# Patient Record
Sex: FEMALE | Race: WHITE | Employment: UNEMPLOYED | URBAN - METROPOLITAN AREA
[De-identification: names, ages, dates, MRNs, and addresses within clinical notes are randomized per-mention and may not be internally consistent; named-entity substitution may affect disease eponyms.]

---

## 2017-12-15 ENCOUNTER — HOSPITAL ENCOUNTER (OUTPATIENT)
Dept: CT IMAGING | Age: 42
Discharge: HOME | End: 2017-12-15
Attending: FAMILY MEDICINE
Payer: MEDICARE

## 2017-12-15 DIAGNOSIS — G44.221 CHRONIC TENSION-TYPE HEADACHE, INTRACTABLE: ICD-10-CM

## 2017-12-15 PROCEDURE — 70450 CT HEAD/BRAIN W/O DYE: CPT

## 2018-06-27 ENCOUNTER — HOSPITAL ENCOUNTER (OUTPATIENT)
Dept: ULTRASOUND IMAGING | Age: 43
Discharge: HOME | End: 2018-06-27
Attending: FAMILY MEDICINE
Payer: MEDICARE

## 2018-06-27 ENCOUNTER — HOSPITAL ENCOUNTER (OUTPATIENT)
Dept: GENERAL RADIOLOGY | Age: 43
Discharge: HOME | End: 2018-06-27
Attending: FAMILY MEDICINE
Payer: MEDICARE

## 2018-06-27 DIAGNOSIS — R10.13 ABDOMINAL PAIN, EPIGASTRIC: ICD-10-CM

## 2018-06-27 PROCEDURE — 74247 XR UPPER GI W KUB AIR CONT: CPT

## 2018-06-27 PROCEDURE — 76700 US EXAM ABDOM COMPLETE: CPT

## 2022-02-25 ENCOUNTER — TELEPHONE (OUTPATIENT)
Dept: FAMILY MEDICINE CLINIC | Age: 47
End: 2022-02-25

## 2022-02-25 ENCOUNTER — OFFICE VISIT (OUTPATIENT)
Dept: FAMILY MEDICINE CLINIC | Age: 47
End: 2022-02-25
Payer: MEDICARE

## 2022-02-25 VITALS
OXYGEN SATURATION: 98 % | BODY MASS INDEX: 28.12 KG/M2 | DIASTOLIC BLOOD PRESSURE: 77 MMHG | SYSTOLIC BLOOD PRESSURE: 114 MMHG | HEIGHT: 62 IN | WEIGHT: 152.8 LBS | HEART RATE: 100 BPM | RESPIRATION RATE: 17 BRPM

## 2022-02-25 DIAGNOSIS — M54.50 CHRONIC BILATERAL LOW BACK PAIN WITHOUT SCIATICA: ICD-10-CM

## 2022-02-25 DIAGNOSIS — G44.221 CHRONIC TENSION-TYPE HEADACHE, INTRACTABLE: ICD-10-CM

## 2022-02-25 DIAGNOSIS — Z76.89 ENCOUNTER TO ESTABLISH CARE: ICD-10-CM

## 2022-02-25 DIAGNOSIS — F32.A DEPRESSION, UNSPECIFIED DEPRESSION TYPE: ICD-10-CM

## 2022-02-25 DIAGNOSIS — F41.9 ANXIETY: Primary | ICD-10-CM

## 2022-02-25 DIAGNOSIS — F43.10 PTSD (POST-TRAUMATIC STRESS DISORDER): ICD-10-CM

## 2022-02-25 DIAGNOSIS — G89.29 CHRONIC BILATERAL LOW BACK PAIN WITHOUT SCIATICA: ICD-10-CM

## 2022-02-25 DIAGNOSIS — L85.8 CUTANEOUS HORN: ICD-10-CM

## 2022-02-25 PROCEDURE — G8427 DOCREV CUR MEDS BY ELIG CLIN: HCPCS | Performed by: STUDENT IN AN ORGANIZED HEALTH CARE EDUCATION/TRAINING PROGRAM

## 2022-02-25 PROCEDURE — G8419 CALC BMI OUT NRM PARAM NOF/U: HCPCS | Performed by: STUDENT IN AN ORGANIZED HEALTH CARE EDUCATION/TRAINING PROGRAM

## 2022-02-25 PROCEDURE — G9717 DOC PT DX DEP/BP F/U NT REQ: HCPCS | Performed by: STUDENT IN AN ORGANIZED HEALTH CARE EDUCATION/TRAINING PROGRAM

## 2022-02-25 PROCEDURE — 99214 OFFICE O/P EST MOD 30 MIN: CPT | Performed by: STUDENT IN AN ORGANIZED HEALTH CARE EDUCATION/TRAINING PROGRAM

## 2022-02-25 PROCEDURE — 99386 PREV VISIT NEW AGE 40-64: CPT | Performed by: STUDENT IN AN ORGANIZED HEALTH CARE EDUCATION/TRAINING PROGRAM

## 2022-02-25 RX ORDER — PERPHENAZINE 4 MG/1
4 TABLET, FILM COATED ORAL
COMMUNITY
Start: 2022-01-14

## 2022-02-25 RX ORDER — BUPROPION HYDROCHLORIDE 100 MG/1
TABLET, EXTENDED RELEASE ORAL
COMMUNITY

## 2022-02-25 RX ORDER — ESCITALOPRAM OXALATE 10 MG/1
10 TABLET ORAL DAILY
Qty: 30 TABLET | Refills: 1 | Status: SHIPPED | OUTPATIENT
Start: 2022-02-25 | End: 2022-04-25 | Stop reason: SDUPTHER

## 2022-02-25 RX ORDER — CYCLOBENZAPRINE HCL 5 MG
5 TABLET ORAL
Qty: 20 TABLET | Refills: 0 | Status: SHIPPED | OUTPATIENT
Start: 2022-02-25 | End: 2022-03-23

## 2022-02-25 RX ORDER — ALPRAZOLAM 0.5 MG/1
TABLET ORAL
COMMUNITY
Start: 2022-01-10

## 2022-02-25 NOTE — PATIENT INSTRUCTIONS
Back Stretches: Exercises  Introduction  Here are some examples of exercises for stretching your back. Start each exercise slowly. Ease off the exercise if you start to have pain. Your doctor or physical therapist will tell you when you can start these exercises and which ones will work best for you. How to do the exercises  Overhead stretch    1. Stand comfortably with your feet shoulder-width apart. 2. Looking straight ahead, raise both arms over your head and reach toward the ceiling. Do not allow your head to tilt back. 3. Hold for 15 to 30 seconds, then lower your arms to your sides. 4. Repeat 2 to 4 times. Side stretch    1. Stand comfortably with your feet shoulder-width apart. 2. Raise one arm over your head, and then lean to the other side. 3. Slide your hand down your leg as you let the weight of your arm gently stretch your side muscles. Hold for 15 to 30 seconds. 4. Repeat 2 to 4 times on each side. Press-up    1. Lie on your stomach, supporting your body with your forearms. 2. Press your elbows down into the floor to raise your upper back. As you do this, relax your stomach muscles and allow your back to arch without using your back muscles. As your press up, do not let your hips or pelvis come off the floor. 3. Hold for 15 to 30 seconds, then relax. 4. Repeat 2 to 4 times. Relax and rest    1. Lie on your back with a rolled towel under your neck and a pillow under your knees. Extend your arms comfortably to your sides. 2. Relax and breathe normally. 3. Remain in this position for about 10 minutes. 4. If you can, do this 2 or 3 times each day. Follow-up care is a key part of your treatment and safety. Be sure to make and go to all appointments, and call your doctor if you are having problems. It's also a good idea to know your test results and keep a list of the medicines you take. Where can you learn more?   Go to http://www.gray.com/  Enter Y090 in the search box to learn more about \"Back Stretches: Exercises. \"  Current as of: July 1, 2021               Content Version: 13.0  © 0816-0686 [employer]wise, Incorporated. Care instructions adapted under license by [employer] (which disclaims liability or warranty for this information). If you have questions about a medical condition or this instruction, always ask your healthcare professional. [place] [address] any warranty or liability for your use of this information.

## 2022-02-25 NOTE — TELEPHONE ENCOUNTER
Pt at our office for new pt appointment scheduled at 3 pm with Dr. Jack Ann and should be at [address] [address]. Pt states she was given this address and has waited 3 months for appt.  Demetra
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
## 2022-02-25 NOTE — PROGRESS NOTES
Room:     Identified pt with two pt identifiers(name and ). Reviewed record in preparation for visit and have obtained necessary documentation. All patient medications has been reviewed. Chief Complaint   Patient presents with    New Patient    Mole     Pt stated she will like to get the skin tag removed.  Tremors     Right hand.  Dizziness       Health Maintenance Due   Topic    Hepatitis C Screening     COVID-19 Vaccine (1)    Depression Monitoring     Cervical cancer screen     Lipid Screen     Colorectal Cancer Screening Combo     Flu Vaccine (1)    Medicare Yearly Exam        Vitals:    22 1536   BP: 114/77   Pulse: 100   Resp: 17   SpO2: 98%   Weight: 152 lb 12.8 oz (69.3 kg)   Height: 5' 2\" (1.575 m)   PainSc:   0 - No pain       4. Have you been to the ER, urgent care clinic since your last visit? Hospitalized since your last visit? No    5. Have you seen or consulted any other health care providers outside of the [address] [place] [address] since your last visit? Include any pap smears or colon screening. Yes, Dom 26. Patient is accompanied by self I have received verbal consent from Opal Bumpers to discuss any/all medical information while they are present in the room.

## 2022-02-28 ENCOUNTER — LAB ONLY (OUTPATIENT)
Dept: FAMILY MEDICINE CLINIC | Age: 47
End: 2022-02-28

## 2022-02-28 DIAGNOSIS — Z76.89 ENCOUNTER TO ESTABLISH CARE: ICD-10-CM

## 2022-03-01 LAB
ANION GAP SERPL CALC-SCNC: 1 MMOL/L (ref 5–15)
BUN SERPL-MCNC: 14 MG/DL (ref 6–20)
BUN/CREAT SERPL: 20 (ref 12–20)
CALCIUM SERPL-MCNC: 9.5 MG/DL (ref 8.5–10.1)
CHLORIDE SERPL-SCNC: 108 MMOL/L (ref 97–108)
CHOLEST SERPL-MCNC: 171 MG/DL
CO2 SERPL-SCNC: 28 MMOL/L (ref 21–32)
CREAT SERPL-MCNC: 0.71 MG/DL (ref 0.55–1.02)
ERYTHROCYTE [DISTWIDTH] IN BLOOD BY AUTOMATED COUNT: 11.9 % (ref 11.5–14.5)
EST. AVERAGE GLUCOSE BLD GHB EST-MCNC: 105 MG/DL
GLUCOSE SERPL-MCNC: 99 MG/DL (ref 65–100)
HBA1C MFR BLD: 5.3 % (ref 4–5.6)
HCT VFR BLD AUTO: 41.1 % (ref 35–47)
HDLC SERPL-MCNC: 48 MG/DL
HDLC SERPL: 3.6 {RATIO} (ref 0–5)
HGB BLD-MCNC: 13.6 G/DL (ref 11.5–16)
LDLC SERPL CALC-MCNC: 98.6 MG/DL (ref 0–100)
MCH RBC QN AUTO: 31.9 PG (ref 26–34)
MCHC RBC AUTO-ENTMCNC: 33.1 G/DL (ref 30–36.5)
MCV RBC AUTO: 96.3 FL (ref 80–99)
NRBC # BLD: 0 K/UL (ref 0–0.01)
NRBC BLD-RTO: 0 PER 100 WBC
PLATELET # BLD AUTO: 278 K/UL (ref 150–400)
PMV BLD AUTO: 11.5 FL (ref 8.9–12.9)
POTASSIUM SERPL-SCNC: 4.2 MMOL/L (ref 3.5–5.1)
RBC # BLD AUTO: 4.27 M/UL (ref 3.8–5.2)
SODIUM SERPL-SCNC: 137 MMOL/L (ref 136–145)
TRIGL SERPL-MCNC: 122 MG/DL (ref ?–150)
TSH SERPL DL<=0.05 MIU/L-ACNC: 2.4 UIU/ML (ref 0.36–3.74)
VLDLC SERPL CALC-MCNC: 24.4 MG/DL
WBC # BLD AUTO: 8.6 K/UL (ref 3.6–11)

## 2022-03-04 ENCOUNTER — TELEPHONE (OUTPATIENT)
Dept: FAMILY MEDICINE CLINIC | Age: 47
End: 2022-03-04

## 2022-03-04 NOTE — TELEPHONE ENCOUNTER
Pt very upset that there are no appointments available today. Pt has a clogged ear x5days. I offered pt next available appointment but unfortunately pt declined.

## 2022-03-04 NOTE — TELEPHONE ENCOUNTER
----- Message from Janora Denver sent at 3/4/2022  8:14 AM EST -----  Subject: Appointment Request    Reason for Call: Ear Problem    QUESTIONS  Type of Appointment? Established Patient  Reason for appointment request? No appointments available during search  Additional Information for Provider? Right ear is clogged and needs an   urgent appointment. Today is day 5.   ---------------------------------------------------------------------------  --------------  CALL BACK INFO  What is the best way for the office to contact you? OK to leave message on   voicemail  Preferred Call Back Phone Number? [phone]  ---------------------------------------------------------------------------  --------------  SCRIPT ANSWERS  Relationship to Patient? Self  Specialty Confirmation? Primary Care  Did you have an injury or trauma within the past three days? No  Is your pain affecting your daily activities? No  Are you having fevers (100.4), chills or sweats? No  Are you experiencing new onset hearing loss? Yes   Have you been diagnosed with, awaiting test results for, or told that you   are suspected of having COVID-19 (Coronavirus)? (If patient has tested   negative or was tested as a requirement for work, school, or travel and   not based on symptoms, answer no)? No  Within the past 10 days have you developed any of the following symptoms   (answer no if symptoms have been present longer than 10 days or began   more than 10 days ago)? Fever or Chills, Cough, Shortness of breath or   difficulty breathing, Loss of taste or smell, Sore throat, Nasal   congestion, Sneezing or runny nose, Fatigue or generalized body aches   (answer no if pain is specific to a body part e.g. back pain), Diarrhea,   Headache? No  Have you had close contact with someone with COVID-19 in the last 7 days? No  (Service Expert  click yes below to proceed with [employer] As Usual   Scheduling)?  Yes

## 2022-03-07 ENCOUNTER — PATIENT MESSAGE (OUTPATIENT)
Dept: FAMILY MEDICINE CLINIC | Age: 47
End: 2022-03-07

## 2022-03-07 NOTE — PROGRESS NOTES
THS wnl. Lipid panel wnl. A1c wnl  BMP wnl. CBC normal.     Message sent to pt through [address]

## 2022-03-23 RX ORDER — CYCLOBENZAPRINE HCL 5 MG
5 TABLET ORAL
Qty: 20 TABLET | Refills: 0 | Status: SHIPPED | OUTPATIENT
Start: 2022-03-23 | End: 2022-05-02

## 2022-04-26 RX ORDER — ESCITALOPRAM OXALATE 10 MG/1
10 TABLET ORAL DAILY
Qty: 30 TABLET | Refills: 1 | Status: SHIPPED | OUTPATIENT
Start: 2022-04-26 | End: 2022-08-29 | Stop reason: ALTCHOICE

## 2022-05-02 RX ORDER — CYCLOBENZAPRINE HCL 5 MG
5 TABLET ORAL
Qty: 20 TABLET | Refills: 0 | Status: SHIPPED | OUTPATIENT
Start: 2022-05-02 | End: 2022-06-04

## 2022-06-04 RX ORDER — CYCLOBENZAPRINE HCL 5 MG
5 TABLET ORAL
Qty: 20 TABLET | Refills: 0 | Status: SHIPPED | OUTPATIENT
Start: 2022-06-04 | End: 2022-07-05

## 2022-06-23 NOTE — PROGRESS NOTES
[address] [facility] [address] [place] [address]   Office [phone], Fax [phone]      Chief Complaint:     Chief Complaint   Patient presents with    UTI     f/u       Ashley Berger is a 51 y.o. female that presents for: UTI       Assessment/Plan:     1. Dysuria  -     AMB POC URINALYSIS DIP STICK AUTO W/O MICRO  -     trimethoprim-sulfamethoxazole (BACTRIM DS, SEPTRA DS) 160-800 mg per tablet; Take 1 Tablet by mouth two (2) times a day for 3 days. Indications: bacterial urinary tract infection, Normal, Disp-6 Tablet, R-0  -     CULTURE, URINE; Future       POC UA unremarkable however Pt with sx's of UTI and noted to have a h/o pyleo with untreated UTI 7-8 years ago. Will go ahead and treat her since she is symptomatic. Vitals stable. No conern for Pyelo or renal stone at this time   -Will tx with above Abx and send for culture   -If she continues to get 3 or more UTI in a year needs to be seen by urology for further work up and possible ppx ab   -red flag sx's discussed with Pt     Follow up: Follow-up and Dispositions    · Return in about 3 days (around 6/27/2022) for if not better . Routing History          Subjective:   HPI:  Ashley Berger is a 51 y.o. female that presents for:    Dysuria and frequency and urgency since Sunday. Has tried AZO for pain for 2 days. No hematuria, fever, chills, n/v/d, abdominal pain   She was sitting in a hot tub on Sunday and think this is what triggered it. LMP: has an IUD in place and only gets spotting occasionally   Last UTI was 3 months ago   Gets them about 3-4 times per year     She has had a h/o Pyelo 6-7 years ago when she \"thought a UTI would go away on its own. \"  Did not get hospitalized for this.  [facility]     Health Maintenance:  Health Maintenance Due   Topic Date Due    Hepatitis C Screening  Never done    COVID-19 Vaccine (1) Never done    Depression Monitoring  Never done    Cervical cancer screen  04/20/2012    Colorectal Cancer Screening Combo  Never done    Medicare Yearly Exam  Never done        ROS:   Review of Systems   Constitutional: Negative for chills and fever. Gastrointestinal: Negative for abdominal pain, nausea and vomiting. Genitourinary: Positive for dysuria, frequency and urgency. Negative for flank pain and hematuria. Neurological: Negative for dizziness. Past medical history, social history, and medications personally reviewed. Past Medical History:   Diagnosis Date    ADHD     Anxiety disorder 5/26/2009    Depression 5/26/2009    Endometriosis 5/26/2009    Fibromyalgia 5/26/2009    Fibromyalgia     Fibromyalgia 1990    Insomnia 5/26/2009        Allergies personally reviewed. No Known Allergies       Objective:   Vitals reviewed. Visit Vitals  /75 (BP 1 Location: Right arm, BP Patient Position: Sitting, BP Cuff Size: Adult)   Pulse 82   Wt 144 lb 4 oz (65.4 kg)   SpO2 97%   BMI 26.38 kg/m²        Physical Exam  Physical Exam  Vitals and nursing note reviewed. HENT:      Head: Normocephalic and atraumatic. Eyes:      Conjunctiva/sclera: Conjunctivae normal.   Cardiovascular:      Rate and Rhythm: Normal rate and regular rhythm. Pulses: Normal pulses. Heart sounds: Normal heart sounds. No murmur heard. No friction rub. No gallop. Pulmonary:      Effort: Pulmonary effort is normal. No respiratory distress. Breath sounds: Normal breath sounds. No wheezing. Abdominal:      General: Abdomen is flat. Bowel sounds are normal. There is no distension. Palpations: Abdomen is soft. There is no mass. Tenderness: There is no abdominal tenderness. There is no right CVA tenderness, left CVA tenderness, guarding or rebound. Musculoskeletal:         General: Normal range of motion. Cervical back: Normal range of motion and neck supple. Skin:     General: Skin is warm and dry. Neurological:      Mental Status: She is alert. Psychiatric:         Mood and Affect: Affect normal.              Pt was discussed with Dr. Romana Lister  (attending physician). I have reviewed pertinent labs results and other data. I have discussed the diagnosis with the patient and the intended plan as seen in the above orders. The patient has received an after-visit summary and questions were answered concerning future plans. I have discussed medication side effects and warnings with the patient as well.     Ann Lundberg, DO  Resident [facility]  06/24/22

## 2022-06-24 ENCOUNTER — OFFICE VISIT (OUTPATIENT)
Dept: FAMILY MEDICINE CLINIC | Age: 47
End: 2022-06-24
Payer: MEDICARE

## 2022-06-24 VITALS
SYSTOLIC BLOOD PRESSURE: 111 MMHG | OXYGEN SATURATION: 97 % | HEART RATE: 82 BPM | DIASTOLIC BLOOD PRESSURE: 75 MMHG | BODY MASS INDEX: 26.38 KG/M2 | WEIGHT: 144.25 LBS

## 2022-06-24 DIAGNOSIS — R30.0 DYSURIA: Primary | ICD-10-CM

## 2022-06-24 LAB
BILIRUB UR QL STRIP: NEGATIVE
GLUCOSE UR-MCNC: NEGATIVE MG/DL
KETONES P FAST UR STRIP-MCNC: NEGATIVE MG/DL
PH UR STRIP: 5.5 [PH] (ref 4.6–8)
PROT UR QL STRIP: NEGATIVE
SP GR UR STRIP: 1.02 (ref 1–1.03)
UA UROBILINOGEN AMB POC: NORMAL (ref 0.2–1)
URINALYSIS CLARITY POC: CLEAR
URINALYSIS COLOR POC: YELLOW
URINE BLOOD POC: NEGATIVE
URINE LEUKOCYTES POC: NEGATIVE
URINE NITRITES POC: NEGATIVE

## 2022-06-24 PROCEDURE — 99214 OFFICE O/P EST MOD 30 MIN: CPT | Performed by: STUDENT IN AN ORGANIZED HEALTH CARE EDUCATION/TRAINING PROGRAM

## 2022-06-24 PROCEDURE — 81003 URINALYSIS AUTO W/O SCOPE: CPT | Performed by: STUDENT IN AN ORGANIZED HEALTH CARE EDUCATION/TRAINING PROGRAM

## 2022-06-24 RX ORDER — SULFAMETHOXAZOLE AND TRIMETHOPRIM 800; 160 MG/1; MG/1
1 TABLET ORAL 2 TIMES DAILY
Qty: 6 TABLET | Refills: 0 | Status: SHIPPED | OUTPATIENT
Start: 2022-06-24 | End: 2022-06-27

## 2022-06-24 NOTE — PROGRESS NOTES
Chief Complaint   Patient presents with    UTI     f/u     Visit Vitals  /75 (BP 1 Location: Right arm, BP Patient Position: Sitting, BP Cuff Size: Adult)   Pulse 82   Wt 144 lb 4 oz (65.4 kg)   SpO2 97%   BMI 26.38 kg/m²

## 2022-06-26 LAB
BACTERIA SPEC CULT: NORMAL
SERVICE CMNT-IMP: NORMAL

## 2022-07-05 RX ORDER — CYCLOBENZAPRINE HCL 5 MG
5 TABLET ORAL
Qty: 20 TABLET | Refills: 0 | Status: SHIPPED | OUTPATIENT
Start: 2022-07-05

## 2022-08-29 ENCOUNTER — OFFICE VISIT (OUTPATIENT)
Dept: FAMILY MEDICINE CLINIC | Age: 47
End: 2022-08-29
Payer: MEDICARE

## 2022-08-29 ENCOUNTER — HOSPITAL ENCOUNTER (OUTPATIENT)
Dept: LAB | Age: 47
Discharge: HOME | End: 2022-08-29

## 2022-08-29 VITALS
HEIGHT: 62 IN | SYSTOLIC BLOOD PRESSURE: 103 MMHG | BODY MASS INDEX: 26.24 KG/M2 | OXYGEN SATURATION: 96 % | WEIGHT: 142.6 LBS | HEART RATE: 79 BPM | DIASTOLIC BLOOD PRESSURE: 65 MMHG | TEMPERATURE: 98 F

## 2022-08-29 DIAGNOSIS — Z12.31 ENCOUNTER FOR SCREENING MAMMOGRAM FOR MALIGNANT NEOPLASM OF BREAST: ICD-10-CM

## 2022-08-29 DIAGNOSIS — L03.313 CELLULITIS OF CHEST WALL: ICD-10-CM

## 2022-08-29 DIAGNOSIS — L98.9 SKIN LESION: ICD-10-CM

## 2022-08-29 DIAGNOSIS — Z80.8 FAMILY HISTORY OF SKIN CANCER: ICD-10-CM

## 2022-08-29 DIAGNOSIS — N30.01 ACUTE CYSTITIS WITH HEMATURIA: ICD-10-CM

## 2022-08-29 DIAGNOSIS — R30.0 DYSURIA: Primary | ICD-10-CM

## 2022-08-29 LAB
BILIRUB UR QL STRIP: NEGATIVE
GLUCOSE UR-MCNC: ABNORMAL MG/DL
KETONES P FAST UR STRIP-MCNC: NEGATIVE MG/DL
PH UR STRIP: 8.5 [PH] (ref 4.6–8)
PROT UR QL STRIP: ABNORMAL
SP GR UR STRIP: 1.01 (ref 1–1.03)
UA UROBILINOGEN AMB POC: ABNORMAL (ref 0.2–1)
URINALYSIS CLARITY POC: ABNORMAL
URINALYSIS COLOR POC: ABNORMAL
URINE BLOOD POC: ABNORMAL
URINE LEUKOCYTES POC: ABNORMAL
URINE NITRITES POC: POSITIVE

## 2022-08-29 PROCEDURE — G9717 DOC PT DX DEP/BP F/U NT REQ: HCPCS | Performed by: STUDENT IN AN ORGANIZED HEALTH CARE EDUCATION/TRAINING PROGRAM

## 2022-08-29 PROCEDURE — G8419 CALC BMI OUT NRM PARAM NOF/U: HCPCS | Performed by: STUDENT IN AN ORGANIZED HEALTH CARE EDUCATION/TRAINING PROGRAM

## 2022-08-29 PROCEDURE — 11106 INCAL BX SKN SINGLE LES: CPT | Performed by: STUDENT IN AN ORGANIZED HEALTH CARE EDUCATION/TRAINING PROGRAM

## 2022-08-29 PROCEDURE — 99214 OFFICE O/P EST MOD 30 MIN: CPT | Performed by: STUDENT IN AN ORGANIZED HEALTH CARE EDUCATION/TRAINING PROGRAM

## 2022-08-29 PROCEDURE — 81003 URINALYSIS AUTO W/O SCOPE: CPT | Performed by: STUDENT IN AN ORGANIZED HEALTH CARE EDUCATION/TRAINING PROGRAM

## 2022-08-29 PROCEDURE — G8427 DOCREV CUR MEDS BY ELIG CLIN: HCPCS | Performed by: STUDENT IN AN ORGANIZED HEALTH CARE EDUCATION/TRAINING PROGRAM

## 2022-08-29 RX ORDER — CEPHALEXIN 500 MG/1
500 CAPSULE ORAL 4 TIMES DAILY
Qty: 28 CAPSULE | Refills: 0 | Status: SHIPPED | OUTPATIENT
Start: 2022-08-29 | End: 2022-08-29 | Stop reason: SDUPTHER

## 2022-08-29 RX ORDER — CEPHALEXIN 500 MG/1
500 CAPSULE ORAL 4 TIMES DAILY
Qty: 28 CAPSULE | Refills: 0 | Status: SHIPPED | OUTPATIENT
Start: 2022-08-29 | End: 2022-09-05

## 2022-08-29 RX ORDER — IMIQUIMOD 12.5 MG/.25G
CREAM TOPICAL
COMMUNITY
End: 2022-10-19

## 2022-08-29 NOTE — PROGRESS NOTES
Identified Patient with two Patient identifiers (Name and ). Two Patient Identifiers confirmed. Reviewed record in preparation for visit and have obtained necessary documentation. Chief Complaint   Patient presents with    Breast Mass     Left breast x couple weeks. Urinary Frequency     Since this morning. Visit Vitals  /65 (BP 1 Location: Left upper arm, BP Patient Position: Sitting, BP Cuff Size: Adult)   Pulse 79   Temp 98 °F (36.7 °C) (Oral)   Ht 5' 2\" (1.575 m)   Wt 142 lb 9.6 oz (64.7 kg)   SpO2 96%   BMI 26.08 kg/m²     1. Have you been to the ER, urgent care clinic since your last visit? Hospitalized since your last visit? No    2. Have you seen or consulted any other health care providers outside of the [address] [place] [address] since your last visit? Include any pap smears or colon screening.  NO  Health Maintenance Due   Topic Date Due    Hepatitis C Screening  Never done    COVID-19 Vaccine (1) Never done    Depression Monitoring  Never done    Cervical cancer screen  2012    Colorectal Cancer Screening Combo  Never done    Medicare Yearly Exam  Never done

## 2022-08-29 NOTE — PROGRESS NOTES
Heike Villegas is an 51 y.o. female who presents for breast lump and UTI    #breast bump  - noticed three weeks ago after trip to [place]  - red bump, almost looked like pimple at first  - something similar before that went away  - adjacent to areola  - tried to manipulate on Saturday and that made is angrier  - about an inch in size  - painful 5/10; worse when it is touched  - left breast  - tried neosporin, no medications or hot compresses  - no discharge from lesion or nipple  - hot tub, pool at the house  - not much hiking or garden works   - did have a tick on belly button in June     #UTI  - suprapubic pain/pressure when she laid down after urinating this morning  - feels a constant urgency  - has gone 12 times since waking this morning  - sensation of needing to have a BM  - noticed precious blood when urinating - not sure if this is from urine or the blood with IUD  - has been with same partner for 2 years, tested for STIs prior  - UA positive for nitrites, leuk esterase  - no fevers, emesis    Does experience nausea, typically when anxiety is worse    Allergies - reviewed:   No Known Allergies      Medications - reviewed:   Current Outpatient Medications   Medication Sig    cephALEXin (KEFLEX) 500 mg capsule Take 1 Capsule by mouth four (4) times daily for 7 days. Indications: an infection of the skin and the tissue below the skin    cyclobenzaprine (FLEXERIL) 5 mg tablet TAKE 1 TABLET BY MOUTH NIGHTLY    buPROPion SR (WELLBUTRIN SR) 100 mg SR tablet Wellbutrin  mg tablet, 12 hr sustained-release   Take 1 tablet 3 times a day by oral route. ALPRAZolam (XANAX) 0.5 mg tablet TAKE 1-2 TABLETS BY MOUTH AS NEEDED    perphenazine (TRILAFON) 4 mg tablet Take 4 mg by mouth nightly. AMPHET ASP/AMPHET/D-AMPHET (ADDERALL PO) take 20 mg by mouth two (2) times a day.  Prescribed by psychiatrist     zolpidem (AMBIEN) 10 mg tablet take 10 mg by mouth nightly.    imiquimod (ALDARA) 5 % cream imiquimod 5 % topical cream packet   APPLY TO AFFECTED AREA 5 TIMES PER WEEK    escitalopram oxalate (LEXAPRO) 10 mg tablet Take 1 Tablet by mouth daily. (Patient not taking: Reported on 8/29/2022)     No current facility-administered medications for this visit. Past Medical History - reviewed:  Past Medical History:   Diagnosis Date    ADHD     Anxiety disorder 5/26/2009    Depression 5/26/2009    Endometriosis 5/26/2009    Fibromyalgia 5/26/2009    Fibromyalgia     Fibromyalgia 1990    Insomnia 5/26/2009         Past Surgical History - reviewed:   Past Surgical History:   Procedure Laterality Date    HX TONSILLECTOMY           Social History - reviewed    ROS: See HPI      Physical Exam  Visit Vitals  /65 (BP 1 Location: Left upper arm, BP Patient Position: Sitting, BP Cuff Size: Adult)   Pulse 79   Temp 98 °F (36.7 °C) (Oral)   Ht 5' 2\" (1.575 m)   Wt 142 lb 9.6 oz (64.7 kg)   SpO2 96%   BMI 26.08 kg/m²       General appearance - alert, well appearing, and in no distress  Chest - clear to auscultation, no wheezes, rales or rhonchi, symmetric air entry  Heart - normal rate, regular rhythm, normal S1, S2, no murmurs, rubs, clicks or gallops  Abdomen - soft, nontender, nondistended, no masses or organomegaly, no CVA tenderness  Neurological - alert, oriented, normal speech, no focal findings or movement disorder noted  Musculoskeletal - no joint tenderness, deformity or swelling  Extremities - peripheral pulses normal, no pedal edema, no clubbing or cyanosis  Skin - normal coloration and turgor; hyperpigemented 3mm lesion, pedunculated. Uniform pigmentation   Breast - Exam chaperoned by Lynette Sandhoff MS4. Left breast with 4 o' clock skin change - erythema with small area (5mm) central fluctuance and surrounding erythema. No central clearing No masses or nodules appreciated in left or right breast.       Assessment/Plan  1. Dysuria  UA consistent with infection. History of pyelonephritis, unknown organism.    - AMB POC URINALYSIS DIP STICK AUTO W/O MICRO  - cephALEXin (KEFLEX) 500 mg capsule; Take 1 Capsule by mouth four (4) times daily for 7 days. Indications: an infection of the skin and the tissue below the skin  Dispense: 28 Capsule; Refill: 0    2. Acute cystitis with hematuria  By UA. Given history of pyelo will send for culture. - CULTURE, URINE; Future  - cephALEXin (KEFLEX) 500 mg capsule; Take 1 Capsule by mouth four (4) times daily for 7 days. Indications: an infection of the skin and the tissue below the skin  Dispense: 28 Capsule; Refill: 0    3. Cellulitis of chest wall  Erythema, no fevers, no discharge. Slightly fluctuance but no obvious drainable collection. Rec warm compresses and will treat surrounding cellulitis. If no improvement, change to medication to cover MRSA. - cephALEXin (KEFLEX) 500 mg capsule; Take 1 Capsule by mouth four (4) times daily for 7 days. Indications: an infection of the skin and the tissue below the skin  Dispense: 28 Capsule; Refill: 0    4. Skin lesion  Lesion present for several years, no change in size but is protruding more. Gets caught on clothing.   - SURGICAL PATHOLOGY; Future    5. Family history of skin cancer  Given family history, hyperpigmented lesion will send for pathology. Lower concern based on clinical history. See procedure not below. - SURGICAL PATHOLOGY; Future    6. Encounter for screening mammogram for malignant neoplasm of breast  Since pt has had two bumps appear at same place, recommend screening mammogram once infection resolves. - [facility] MAMMO BI SCREENING INCL CAD; Future        Follow-up and Dispositions    Return in about 2 months (around 10/29/2022) for Physical.           I have discussed the diagnosis with the patient and the intended plan as seen in the above orders. Patient verbalized understanding of the plan and agrees with the plan. The patient has received an after-visit summary and questions were answered concerning future plans.   I have discussed medication side effects and warnings with the patient as well. Informed patient to return to the office if new symptoms arise. Yaya Ocasio MD    Time Out Process    Time Out performed prior to start of the procedure:    Natali Walker MD, have performed the following reviews on [unfilled] prior to the start of the procedure:    patient was identified by name and  1975    agreement on procedure being performed was verified:  yes, skin tag removal  risks and benefits explained to patient  procedure site verified as chest  patient was positioned for comfort  consent singed and verified for procedure:  12:10    Time:   12:13    Date of procedure:   22    Procedure performed by:   Jennifer Diallo    Patient assisted by:   none      How tolerated by patient:   well     Procedure Note    Performed By:  Yaya Ocasio MD     Assistant:   Tootie Ramos MS4    Anesthesia: None     Procedure Details: The risks,benefits and alternatives  were explained and consent was obtained for the procedure. Time out performed, cross checking patient ID and procedure. The area was cleansed with  alcohol swabs  and draped in the usual manner. Local anesthesia was not used, minor procedure    Description - Area was cleaned 3x with alcohol. Pickups used to elevate pedunculated hyperpigmented lesion on center chest and 15 blade used to remove lesion at narrow stalk. Due to family history of skin cancer, lesion sent for pathology. Estimated Blood Loss:  Minimal           Complications:  None; patient tolerated the procedure well.            Condition: stable           Signed By: Yaya Ocasio MD

## 2022-08-31 LAB
BACTERIA SPEC CULT: NORMAL
SERVICE CMNT-IMP: NORMAL

## 2022-09-07 ENCOUNTER — TELEPHONE (OUTPATIENT)
Dept: FAMILY MEDICINE CLINIC | Age: 47
End: 2022-09-07

## 2022-09-07 DIAGNOSIS — L98.9 SKIN LESION: Primary | ICD-10-CM

## 2022-09-07 NOTE — TELEPHONE ENCOUNTER
----- Message from Adair Rodríguez sent at 9/6/2022  8:45 AM EDT -----  Subject: Message to Provider    QUESTIONS  Information for Provider? Andre Richards was given an antibiotic on 8/29 for a UTI   and a skin abscess on the left breast. Lakeisha was told if the abscess was   not gone to call the office back and Sriram Opal would prescribe her   a stronger antibiotic for the skin abscess. Please reach out to Andre Richards   ASAP. Please use pharmacy? [address] [facility] [address], [address] [facility]  ---------------------------------------------------------------------------  --------------  Jacoby Ventura [facility]  [phone]; OK to leave message on voicemail  ---------------------------------------------------------------------------  --------------  SCRIPT ANSWERS  Relationship to Patient?  Self

## 2022-09-08 ENCOUNTER — TELEPHONE (OUTPATIENT)
Dept: FAMILY MEDICINE CLINIC | Age: 47
End: 2022-09-08

## 2022-09-08 RX ORDER — DOXYCYCLINE 100 MG/1
100 CAPSULE ORAL 2 TIMES DAILY
Qty: 20 CAPSULE | Refills: 0 | Status: SHIPPED | OUTPATIENT
Start: 2022-09-08 | End: 2022-09-18

## 2022-09-08 NOTE — TELEPHONE ENCOUNTER
Patient called stating that this is her fourth day in a row trying to get in touch with the doctor. She said that she finished the medication on Sunday for the bump on her breast, but the bump hasn't gone away. She stated that she was suppose to be prescribed a stronger medication, but hasn't heard anything. She would like to be contacted asap. 23-14-20-09. Thanks!

## 2022-09-08 NOTE — TELEPHONE ENCOUNTER
Called and spoke with pt. Erythema around nodule has improved, nodule is still present and firm. Has intermittently done warm compresses. Nodule still erythematous. Encourage warm compresses 3-6x/day. Sent in doxycycline to cover for MRSA; nodule is likely indurated and not able to be drained in clinic. Follow up in person if no improvement.

## 2022-09-26 ENCOUNTER — OFFICE VISIT (OUTPATIENT)
Dept: FAMILY MEDICINE CLINIC | Age: 47
End: 2022-09-26
Payer: MEDICARE

## 2022-09-26 VITALS
BODY MASS INDEX: 24.84 KG/M2 | WEIGHT: 135 LBS | HEIGHT: 62 IN | RESPIRATION RATE: 16 BRPM | HEART RATE: 100 BPM | SYSTOLIC BLOOD PRESSURE: 96 MMHG | TEMPERATURE: 98.4 F | DIASTOLIC BLOOD PRESSURE: 65 MMHG | OXYGEN SATURATION: 98 %

## 2022-09-26 DIAGNOSIS — Z11.59 NEED FOR HEPATITIS C SCREENING TEST: ICD-10-CM

## 2022-09-26 DIAGNOSIS — R68.81 EARLY SATIETY: ICD-10-CM

## 2022-09-26 DIAGNOSIS — Z11.4 SCREENING FOR HIV (HUMAN IMMUNODEFICIENCY VIRUS): ICD-10-CM

## 2022-09-26 DIAGNOSIS — R63.4 WEIGHT LOSS, NON-INTENTIONAL: ICD-10-CM

## 2022-09-26 DIAGNOSIS — Z80.0 FAMILY HISTORY OF COLON CANCER: ICD-10-CM

## 2022-09-26 DIAGNOSIS — Z23 ENCOUNTER FOR IMMUNIZATION: ICD-10-CM

## 2022-09-26 DIAGNOSIS — Z12.11 SCREENING FOR COLON CANCER: ICD-10-CM

## 2022-09-26 DIAGNOSIS — R79.9 ABNORMAL FINDING OF BLOOD CHEMISTRY, UNSPECIFIED: ICD-10-CM

## 2022-09-26 DIAGNOSIS — Z00.00 WELL WOMAN EXAM (NO GYNECOLOGICAL EXAM): Primary | ICD-10-CM

## 2022-09-26 PROBLEM — R10.2 PAIN IN PELVIS: Status: ACTIVE | Noted: 2017-12-05

## 2022-09-26 PROCEDURE — 99214 OFFICE O/P EST MOD 30 MIN: CPT | Performed by: STUDENT IN AN ORGANIZED HEALTH CARE EDUCATION/TRAINING PROGRAM

## 2022-09-26 PROCEDURE — G8427 DOCREV CUR MEDS BY ELIG CLIN: HCPCS | Performed by: STUDENT IN AN ORGANIZED HEALTH CARE EDUCATION/TRAINING PROGRAM

## 2022-09-26 PROCEDURE — G8420 CALC BMI NORM PARAMETERS: HCPCS | Performed by: STUDENT IN AN ORGANIZED HEALTH CARE EDUCATION/TRAINING PROGRAM

## 2022-09-26 PROCEDURE — G9717 DOC PT DX DEP/BP F/U NT REQ: HCPCS | Performed by: STUDENT IN AN ORGANIZED HEALTH CARE EDUCATION/TRAINING PROGRAM

## 2022-09-26 PROCEDURE — G0008 ADMIN INFLUENZA VIRUS VAC: HCPCS | Performed by: STUDENT IN AN ORGANIZED HEALTH CARE EDUCATION/TRAINING PROGRAM

## 2022-09-26 PROCEDURE — 99396 PREV VISIT EST AGE 40-64: CPT | Performed by: STUDENT IN AN ORGANIZED HEALTH CARE EDUCATION/TRAINING PROGRAM

## 2022-09-26 PROCEDURE — 90686 IIV4 VACC NO PRSV 0.5 ML IM: CPT | Performed by: STUDENT IN AN ORGANIZED HEALTH CARE EDUCATION/TRAINING PROGRAM

## 2022-09-26 RX ORDER — DEXTROAMPHETAMINE SACCHARATE, AMPHETAMINE ASPARTATE, DEXTROAMPHETAMINE SULFATE AND AMPHETAMINE SULFATE 5; 5; 5; 5 MG/1; MG/1; MG/1; MG/1
TABLET ORAL
COMMUNITY
Start: 2022-09-15

## 2022-09-26 NOTE — PROGRESS NOTES
Identified Patient with two Patient identifiers (Name and ). Two Patient Identifiers confirmed. Reviewed record in preparation for visit and have obtained necessary documentation. Chief Complaint   Patient presents with    Complete Physical       Visit Vitals  BP 96/65 (BP 1 Location: Right arm, BP Patient Position: Sitting, BP Cuff Size: Adult)   Pulse 100   Temp 98.4 °F (36.9 °C) (Oral)   Resp 16   Ht 5' 2\" (1.575 m)   Wt 135 lb (61.2 kg)   SpO2 98%   BMI 24.69 kg/m²       1. Have you been to the ER, urgent care clinic since your last visit? Hospitalized since your last visit? No    2. Have you seen or consulted any other health care providers outside of the [address] [place] [address] since your last visit? Include any pap smears or colon screening.  No
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
## 2022-09-27 LAB
ALBUMIN SERPL-MCNC: 4.2 G/DL (ref 3.5–5)
ALBUMIN/GLOB SERPL: 1.2 {RATIO} (ref 1.1–2.2)
ALP SERPL-CCNC: 71 U/L (ref 45–117)
ALT SERPL-CCNC: 21 U/L (ref 12–78)
ANION GAP SERPL CALC-SCNC: 3 MMOL/L (ref 5–15)
AST SERPL-CCNC: 13 U/L (ref 15–37)
BASOPHILS # BLD: 0 K/UL (ref 0–0.1)
BASOPHILS NFR BLD: 1 % (ref 0–1)
BILIRUB SERPL-MCNC: 0.5 MG/DL (ref 0.2–1)
BUN SERPL-MCNC: 10 MG/DL (ref 6–20)
BUN/CREAT SERPL: 12 (ref 12–20)
CALCIUM SERPL-MCNC: 9.7 MG/DL (ref 8.5–10.1)
CHLORIDE SERPL-SCNC: 107 MMOL/L (ref 97–108)
CO2 SERPL-SCNC: 28 MMOL/L (ref 21–32)
CREAT SERPL-MCNC: 0.85 MG/DL (ref 0.55–1.02)
CRP SERPL-MCNC: <0.29 MG/DL (ref 0–0.6)
DIFFERENTIAL METHOD BLD: NORMAL
EOSINOPHIL # BLD: 0.1 K/UL (ref 0–0.4)
EOSINOPHIL NFR BLD: 2 % (ref 0–7)
ERYTHROCYTE [DISTWIDTH] IN BLOOD BY AUTOMATED COUNT: 12.2 % (ref 11.5–14.5)
EST. AVERAGE GLUCOSE BLD GHB EST-MCNC: 105 MG/DL
GLOBULIN SER CALC-MCNC: 3.4 G/DL (ref 2–4)
GLUCOSE SERPL-MCNC: 85 MG/DL (ref 65–100)
HBA1C MFR BLD: 5.3 % (ref 4–5.6)
HCT VFR BLD AUTO: 44.5 % (ref 35–47)
HGB BLD-MCNC: 14.6 G/DL (ref 11.5–16)
HIV 1+2 AB+HIV1 P24 AG SERPL QL IA: NONREACTIVE
HIV12 RESULT COMMENT, HHIVC: NORMAL
IMM GRANULOCYTES # BLD AUTO: 0 K/UL (ref 0–0.04)
IMM GRANULOCYTES NFR BLD AUTO: 0 % (ref 0–0.5)
LYMPHOCYTES # BLD: 2.2 K/UL (ref 0.8–3.5)
LYMPHOCYTES NFR BLD: 34 % (ref 12–49)
MCH RBC QN AUTO: 31.9 PG (ref 26–34)
MCHC RBC AUTO-ENTMCNC: 32.8 G/DL (ref 30–36.5)
MCV RBC AUTO: 97.4 FL (ref 80–99)
MONOCYTES # BLD: 0.5 K/UL (ref 0–1)
MONOCYTES NFR BLD: 7 % (ref 5–13)
NEUTS SEG # BLD: 3.7 K/UL (ref 1.8–8)
NEUTS SEG NFR BLD: 56 % (ref 32–75)
NRBC # BLD: 0 K/UL (ref 0–0.01)
NRBC BLD-RTO: 0 PER 100 WBC
PLATELET # BLD AUTO: 279 K/UL (ref 150–400)
PMV BLD AUTO: 12.3 FL (ref 8.9–12.9)
POTASSIUM SERPL-SCNC: 4.6 MMOL/L (ref 3.5–5.1)
PROT SERPL-MCNC: 7.6 G/DL (ref 6.4–8.2)
RBC # BLD AUTO: 4.57 M/UL (ref 3.8–5.2)
SODIUM SERPL-SCNC: 138 MMOL/L (ref 136–145)
TSH SERPL DL<=0.05 MIU/L-ACNC: 0.66 UIU/ML (ref 0.36–3.74)
WBC # BLD AUTO: 6.6 K/UL (ref 3.6–11)

## 2022-10-04 ENCOUNTER — TELEPHONE (OUTPATIENT)
Dept: FAMILY MEDICINE CLINIC | Age: 47
End: 2022-10-04

## 2022-10-10 NOTE — TELEPHONE ENCOUNTER
----- Message from Chel Raymundo sent at 10/4/2022 10:16 AM EDT -----  Subject: Message to Provider    QUESTIONS  Information for Provider? pt reviewed her after visit summary from last   visit and it says to fu with sports medicine in two weeks pt was unaware   she needed to do this, however there is no referral please call pt the   clarify  ---------------------------------------------------------------------------  --------------  [facility]  [phone]; OK to leave message on voicemail  ---------------------------------------------------------------------------  --------------  SCRIPT ANSWERS  Relationship to Patient?  Self

## 2022-10-12 ENCOUNTER — TELEPHONE (OUTPATIENT)
Dept: FAMILY MEDICINE CLINIC | Age: 47
End: 2022-10-12

## 2022-10-12 NOTE — TELEPHONE ENCOUNTER
Pt calling regarding concussion treatment, she would  like a call back or message to know about any possible treatment you may have looked into.

## 2022-10-13 ENCOUNTER — TELEPHONE (OUTPATIENT)
Dept: FAMILY MEDICINE CLINIC | Age: 47
End: 2022-10-13

## 2022-10-14 ENCOUNTER — OFFICE VISIT (OUTPATIENT)
Dept: FAMILY MEDICINE CLINIC | Age: 47
End: 2022-10-14
Payer: MEDICARE

## 2022-10-14 VITALS
HEIGHT: 62 IN | TEMPERATURE: 98.3 F | OXYGEN SATURATION: 99 % | DIASTOLIC BLOOD PRESSURE: 60 MMHG | HEART RATE: 103 BPM | RESPIRATION RATE: 16 BRPM | WEIGHT: 135.8 LBS | BODY MASS INDEX: 24.99 KG/M2 | SYSTOLIC BLOOD PRESSURE: 96 MMHG

## 2022-10-14 DIAGNOSIS — M54.50 BILATERAL LOW BACK PAIN WITHOUT SCIATICA, UNSPECIFIED CHRONICITY: ICD-10-CM

## 2022-10-14 DIAGNOSIS — M62.838 TRAPEZIUS MUSCLE SPASM: Primary | ICD-10-CM

## 2022-10-14 PROCEDURE — G8427 DOCREV CUR MEDS BY ELIG CLIN: HCPCS | Performed by: FAMILY MEDICINE

## 2022-10-14 PROCEDURE — G8420 CALC BMI NORM PARAMETERS: HCPCS | Performed by: FAMILY MEDICINE

## 2022-10-14 PROCEDURE — G9717 DOC PT DX DEP/BP F/U NT REQ: HCPCS | Performed by: FAMILY MEDICINE

## 2022-10-14 PROCEDURE — 99213 OFFICE O/P EST LOW 20 MIN: CPT | Performed by: FAMILY MEDICINE
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
## 2022-10-14 NOTE — PROGRESS NOTES
Identified pt with two pt identifiers(name and ). Reviewed record in preparation for visit and have obtained necessary documentation. Chief Complaint   Patient presents with    Thumb Pain     Right, for a month and half, only hurts when held in certain positions or touch/push on it too much    Neck Pain     X much of adult life, back pain when standing, sitting down right hip pain, right shoulder blade pain, patient states she feels like her whole body feels twisted        Health Maintenance Due   Topic    Colorectal Cancer Screening Combo     Medicare Yearly Exam        Visit Vitals  BP 96/60 (BP 1 Location: Right upper arm, BP Patient Position: Sitting, BP Cuff Size: Small adult)   Pulse (!) 103   Temp 98.3 °F (36.8 °C) (Oral)   Resp 16   Ht 5' 2\" (1.575 m)   Wt 135 lb 12.8 oz (61.6 kg)   SpO2 99%   BMI 24.84 kg/m²         Coordination of Care Questionnaire:  :   1) Have you been to an emergency room, urgent care, or hospitalized since your last visit? If yes, where when, and reason for visit? no       2. Have seen or consulted any other health care provider since your last visit? If yes, where when, and reason for visit? NO        Patient is accompanied by self I have received verbal consent from Jonathan Eason to discuss any/all medical information while they are present in the room.

## 2022-10-19 ENCOUNTER — OFFICE VISIT (OUTPATIENT)
Dept: FAMILY MEDICINE CLINIC | Age: 47
End: 2022-10-19
Payer: MEDICARE

## 2022-10-19 VITALS
SYSTOLIC BLOOD PRESSURE: 110 MMHG | WEIGHT: 138 LBS | DIASTOLIC BLOOD PRESSURE: 74 MMHG | OXYGEN SATURATION: 97 % | HEART RATE: 85 BPM | BODY MASS INDEX: 25.24 KG/M2

## 2022-10-19 DIAGNOSIS — G44.221 CHRONIC TENSION-TYPE HEADACHE, INTRACTABLE: ICD-10-CM

## 2022-10-19 DIAGNOSIS — Z87.820 HISTORY OF BRAIN CONCUSSION: Primary | ICD-10-CM

## 2022-10-19 DIAGNOSIS — H93.13 TINNITUS OF BOTH EARS: ICD-10-CM

## 2022-10-19 PROCEDURE — 99214 OFFICE O/P EST MOD 30 MIN: CPT | Performed by: STUDENT IN AN ORGANIZED HEALTH CARE EDUCATION/TRAINING PROGRAM

## 2022-10-19 PROCEDURE — G8427 DOCREV CUR MEDS BY ELIG CLIN: HCPCS | Performed by: STUDENT IN AN ORGANIZED HEALTH CARE EDUCATION/TRAINING PROGRAM

## 2022-10-19 PROCEDURE — G9717 DOC PT DX DEP/BP F/U NT REQ: HCPCS | Performed by: STUDENT IN AN ORGANIZED HEALTH CARE EDUCATION/TRAINING PROGRAM

## 2022-10-19 PROCEDURE — G8419 CALC BMI OUT NRM PARAM NOF/U: HCPCS | Performed by: STUDENT IN AN ORGANIZED HEALTH CARE EDUCATION/TRAINING PROGRAM
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
## 2022-10-19 NOTE — PATIENT INSTRUCTIONS
Medication options for chronic Headaches (typically for migraine prophylaxis):  - Venlafaxine (Effexor)  - Amitriptyline (can also help with sleep) or nortriptyline  - Magnesium  - Topamax  - Propranolol     Exercise: http://www.Wayin/  Apps - Couch to [employer] (has free guided walks/runs with [employer], a meditation practice)    [facility]  Phone: [phone]  Website: [email or web address]

## 2022-10-19 NOTE — PROGRESS NOTES
Chief Complaint   Patient presents with    Follow-up     From previous APPOINTMENT     Visit Vitals  /74 (BP 1 Location: Left upper arm, BP Patient Position: Sitting, BP Cuff Size: Adult)   Pulse 85   Wt 138 lb (62.6 kg)   SpO2 97%   BMI 25.24 kg/m²

## 2022-11-01 ENCOUNTER — OFFICE VISIT (OUTPATIENT)
Dept: FAMILY MEDICINE CLINIC | Age: 47
End: 2022-11-01
Payer: MEDICARE

## 2022-11-01 VITALS
OXYGEN SATURATION: 99 % | SYSTOLIC BLOOD PRESSURE: 107 MMHG | BODY MASS INDEX: 25.12 KG/M2 | RESPIRATION RATE: 18 BRPM | WEIGHT: 136.5 LBS | TEMPERATURE: 98.7 F | HEIGHT: 62 IN | HEART RATE: 100 BPM | DIASTOLIC BLOOD PRESSURE: 65 MMHG

## 2022-11-01 DIAGNOSIS — M77.8 TENDINITIS OF RIGHT WRIST: Primary | ICD-10-CM

## 2022-11-01 DIAGNOSIS — M65.4 DE QUERVAIN'S DISEASE (TENOSYNOVITIS): ICD-10-CM

## 2022-11-01 PROCEDURE — G8420 CALC BMI NORM PARAMETERS: HCPCS

## 2022-11-01 PROCEDURE — 99213 OFFICE O/P EST LOW 20 MIN: CPT

## 2022-11-01 PROCEDURE — G9717 DOC PT DX DEP/BP F/U NT REQ: HCPCS

## 2022-11-01 PROCEDURE — G8427 DOCREV CUR MEDS BY ELIG CLIN: HCPCS

## 2022-11-01 NOTE — PROGRESS NOTES
Meredith Yanez is a 49 y.o. female who presents for Wrist Pain (Patient states she wears a carpal tunnel brace while she sleeps. She woke up on Sunday and it felt that her wrist was sprained. The pain has gotten worse,  pain score 7-8. Patient took ibuprofen, which gives temporary relief. Patient puts a wrist brace on and the pressure helps it. The area is also tender to touch. Patient did have a injury on her right thumb about 2 months ago. )    HPI  - Patient is here for evaluation of wrist pain starting 10/30. The pain was there when she woke up that morning and it felt like she had sprained her wrist. Ibuprofen and wrist braces provide only minimal comfort. Pain has progressively been worsening.   - She has previously been diagnosed with carpal tunnel. She is disabled but does lots of crafts like painting. She was using a massage gun on her back the night before and was gripping the gun and holding it over the back and thinks that contributed to the pain. - She has had no fall on outstretched hand or other trauma to the wrist.    - Patient was seen by Dr. Nhi Beach on 10/14 for R. Thumb pain and was advised to use naproxen/tylenol for pain relief and to ice the joint if it gets painful. She said that the pain did improve but it is still painful when she touches it or  objects a particular way. There was no history of trauma to the thumb. Review of Systems   Negative except per HPI.     Medical History  Past Medical History:   Diagnosis Date    ADHD     Anxiety disorder 5/26/2009    Depression 5/26/2009    Endometriosis 5/26/2009    Fibromyalgia 5/26/2009    Fibromyalgia     Fibromyalgia 1990    Insomnia 5/26/2009       Medications  Current Outpatient Medications   Medication Sig    dextroamphetamine-amphetamine (ADDERALL) 20 mg tablet TAKE 1 TABLET BY MOUTH TWICE A DAY (7/28)    cyclobenzaprine (FLEXERIL) 5 mg tablet TAKE 1 TABLET BY MOUTH NIGHTLY    buPROPion SR (WELLBUTRIN SR) 100 mg SR tablet Wellbutrin  mg tablet, 12 hr sustained-release   Take 1 tablet 3 times a day by oral route. ALPRAZolam (XANAX) 0.5 mg tablet TAKE 1-2 TABLETS BY MOUTH AS NEEDED    perphenazine (TRILAFON) 4 mg tablet Take 4 mg by mouth nightly. zolpidem (AMBIEN) 10 mg tablet take 10 mg by mouth nightly. No current facility-administered medications for this visit. Immunizations   Immunization History   Administered Date(s) Administered    COVID-19, MODERNA BLUE border, Primary or Immunocompromised, (age 18y+), IM, 100 mcg/0.5mL 04/02/2021, 04/30/2021, 12/28/2021    Influenza Vaccine Intradermal PF 10/16/2014    Influenza Vaccine PF 11/09/2018    Influenza, FLUARIX, FLULAVAL, FLUZONE (age 10 mo+) AND AFLURIA, (age 1 y+), PF, 0.5mL 09/26/2022    Tdap 06/13/2015       Allergies   No Known Allergies    Objective   Vital Signs  Visit Vitals  /65 (BP 1 Location: Right upper arm, BP Patient Position: Sitting, BP Cuff Size: Adult)   Pulse 100   Temp 98.7 °F (37.1 °C) (Temporal)   Resp 18   Ht 5' 2\" (1.575 m)   Wt 136 lb 8 oz (61.9 kg)   SpO2 99%   BMI 24.97 kg/m²       Physical Examination  Physical Exam  Constitutional:       Appearance: Normal appearance. Cardiovascular:      Rate and Rhythm: Normal rate and regular rhythm. Pulses: Normal pulses. Heart sounds: Normal heart sounds. Pulmonary:      Effort: Pulmonary effort is normal.      Breath sounds: Normal breath sounds. Musculoskeletal:      Right wrist: Tenderness present. No swelling, deformity, effusion or snuff box tenderness. Normal range of motion. Left wrist: Normal. No swelling, deformity, effusion or snuff box tenderness. Normal range of motion. Comments: Normal strength and range of motion in bilateral wrists. Positive Finkelstein test.   Neurological:      Mental Status: She is alert.           Assessment and Plan   Bennetta Goldberg is a 49 y.o. female who presents for Wrist Pain (Patient states she wears a carpal tunnel brace while she sleeps. She woke up on Sunday and it felt that her wrist was sprained. The pain has gotten worse,  pain score 7-8. Patient took ibuprofen, which gives temporary relief. Patient puts a wrist brace on and the pressure helps it. The area is also tender to touch. Patient did have a injury on her right thumb about 2 months ago. )      1. Tendinitis of right wrist and 2. DeQuervain's disease of R. wrist  - Has only been using aleve 2 twice per day. - Patient counseled to take 2 aleve twice a day with breakfast and dinner.   - She can alternate tylenol in between doses of the aleve. - Continue to use splint if it is helpful for her pain. - Encouraged her to ice her wrist for 10-15 minutes at least three times a day to combat the inflammation.   - Follow up in 2 weeks with sports medicine clinic if her symptoms do not improve or worsen. Counseled that inflammation may take up to a couple of weeks to resolve. May require a steroid shot for possible component of De Quervian's tenosynovitis. Return for in 2 weeks if symptoms worsen or fail to improve. .    Pt was discussed with Dr. Josie Duarte MD (attending physician).     Dania Bridges MD  PGY-1 Resident, [facility]  11/01/22

## 2022-11-01 NOTE — PROGRESS NOTES
Chief Complaint   Patient presents with    Wrist Pain     Patient states she wears a carpal tunnel brace while she sleeps. She woke up on Sunday and it felt that her wrist was sprained. The pain has gotten worse,  pain score 7-8. Patient took ibuprofen, which gives temporary relief. Patient puts a wrist brace on and the pressure helps it. The area is also tender to touch. Patient did have a injury on her right thumb about 2 months ago. Visit Vitals  /65 (BP 1 Location: Right upper arm, BP Patient Position: Sitting, BP Cuff Size: Adult)   Pulse 100   Temp 98.7 °F (37.1 °C) (Temporal)   Resp 18   Ht 5' 2\" (1.575 m)   Wt 136 lb 8 oz (61.9 kg)   SpO2 99%   BMI 24.97 kg/m²     1. Have you been to the ER, urgent care clinic since your last visit? Hospitalized since your last visit? No    2. Have you seen or consulted any other health care providers outside of the [address] [place] [address] since your last visit? Include any pap smears or colon screening.  No

## 2022-11-15 NOTE — PROGRESS NOTES
[address] [facility]      Chief Complaint:   Chief Complaint   Patient presents with    Wrist Pain     Follow up on wrist pain, patient seen 11/1/22 and told to follow up with [facility] if not better. Pain has improved, but is still there. Patient is wearing a splint at night for her wrist. Currently pain 0/10, pain only present with certain movements/touch. ASSESSMENT:    ICD-10-CM ICD-9-CM    1. Tendinitis of right wrist  M77.8 727.05         50 yo female presents for concern for [employer] Tenosynovitis. Finkelstein's negative. Has some point tenderness over the tendons at the distal radial wrist. Strength and sensation intact though some discomfort with resisted thumb abduction. We discussed continued Aleve, though encouraged Tylenol instead of the Motrin for breakthrough, and continued bracing and icing for comfort. She cannot do OT to the hand due to pending PT for concussion. She would like to try a CSI. PLAN:  Options Discussed, will defer OT due to current plan to go to PT for concussion   1st Dorsal Compartment CSI under ultrasound guidance  Ok to continue bracing, PRN OTC medications   Follow-up as needed    HPI:  Bennetta Goldberg is a 49 y.o. female who presents with right wrist and thumb pain. Was seen 11/1/22 by PCP with possible consideration for [employer] and CTS. Since that visit has been feeling better but still experiencing pain with activities. CTS brace at night and soft splint during day. Thumb pain started in August, felt like there might have been a dog bite at the time. The wrist pain started 2 weeks ago when woke up with pain. Was using a gun massager at a weird angle prior. She has found this improves with the brace, ice. Uses Aleve BID and motrin in between (not currently using tylenol). Picking up heavy things and certain movements make worse. It is achy. 4/10 at worst, 0/10 without activity.  Mostly in radial side of thumb and up radial side of arm. Denies weakness. Has had some medial epicondyle type pain without radiation. CTS brace helps with pain at night. Does a lot of crafts. Injections: Not into wrist, has had for tennis elbow     Past Medical History:   Diagnosis Date    ADHD     Anxiety disorder 5/26/2009    Depression 5/26/2009    Endometriosis 5/26/2009    Fibromyalgia 5/26/2009    Fibromyalgia     Fibromyalgia 1990    Insomnia 5/26/2009       Current Outpatient Medications:     omeprazole (PRILOSEC) 40 mg capsule, TAKE 1 CAPSULE BY MOUTH EVERY DAY IN THE MORNING, Disp: , Rfl:     dextroamphetamine-amphetamine (ADDERALL) 20 mg tablet, TAKE 1 TABLET BY MOUTH TWICE A DAY (7/28), Disp: , Rfl:     cyclobenzaprine (FLEXERIL) 5 mg tablet, TAKE 1 TABLET BY MOUTH NIGHTLY, Disp: 20 Tablet, Rfl: 0    buPROPion SR (WELLBUTRIN SR) 100 mg SR tablet, Wellbutrin  mg tablet, 12 hr sustained-release  Take 1 tablet 3 times a day by oral route., Disp: , Rfl:     ALPRAZolam (XANAX) 0.5 mg tablet, TAKE 1-2 TABLETS BY MOUTH AS NEEDED, Disp: , Rfl:     perphenazine (TRILAFON) 4 mg tablet, Take 4 mg by mouth nightly., Disp: , Rfl:     zolpidem (AMBIEN) 10 mg tablet, take 10 mg by mouth nightly., Disp: , Rfl:   No Known Allergies  Past Medical History:   Diagnosis Date    ADHD     Anxiety disorder 5/26/2009    Depression 5/26/2009    Endometriosis 5/26/2009    Fibromyalgia 5/26/2009    Fibromyalgia     Fibromyalgia 1990    Insomnia 5/26/2009     Family History   Problem Relation Age of Onset    Depression Mother     Osteoporosis Mother     Coronary Art Dis Father         s/p CABG    Heart Disease Father      ROS: As per HPI otherwise negative. Objective:   Visit Vitals  Wt 133 lb (60.3 kg)   BMI 24.33 kg/m²       Gen: Well appearing. No apparent distress. Alert and oriented. Responds to all questions appropriately. Lungs: No labored respirations. Talking in complete sentences without difficulty.   CV: Pulses regular, no edema    Neuro: Neurologically intact . Skin: No obvious rash or skin breakdown. Musculoskeletal: Exam of Right Wrist     Inspection: No noted atrophy, ecchymosis, fullness over distal wrist   Palpation: TTP over distal tip of radial head over 1st dorsal compartment tendons; Mild TTP over 1st IP; No TTP over Snuffbox, DRUJ, Ulnar Styloid   ROM: Can make a composite fist, achieve full finger extension, wrist flexion and extension, pronation and supination  Special Tests: Negative Finkelstein's, tinels at wrist and elbow, CMC grind, flexion test at elbow for ulnar neuropathy  Strength: 5/5 hand intrinsics, does experience pain with resisted radial though not palmar abduction of thumb   Sensation: Equal and intact bilaterally    Contralateral side without edema or ecchymosis with full ROM, sensation and strength     Imaging: Brief in clinic US during procedure, no noted effusion over 1st dorsal compartment    PROCEDURE NOTE:     Informed consent obtained verbally and risks, benefits and alternatives discussed. Time out performed, cross checking patient ID and procedure. The right wrist and first dorsal compartment were cleaned and prepped with sterile technique using Alcohol x 3 and Chloraprep x1 and anesthetized with ethyl chloride spray. The 1st dorsal compartment was identified with ultrasound and injected with Celestone 3 mg and 0.5 ml of normal saline under sterile conditions using ultrasound guidance. The patient initially experienced some vasovagal symptoms and flushing and laid down on exam bed for 5 minutes and this subsided.         Discussed with attending physician, Dr. Pily Avila, Dr. Pily Avila was present for procedure    Clementina Romero DO  Sports Medicine Fellow

## 2022-11-16 ENCOUNTER — OFFICE VISIT (OUTPATIENT)
Dept: FAMILY MEDICINE CLINIC | Age: 47
End: 2022-11-16
Payer: MEDICARE

## 2022-11-16 ENCOUNTER — TELEPHONE (OUTPATIENT)
Dept: FAMILY MEDICINE CLINIC | Age: 47
End: 2022-11-16

## 2022-11-16 VITALS
OXYGEN SATURATION: 97 % | DIASTOLIC BLOOD PRESSURE: 68 MMHG | SYSTOLIC BLOOD PRESSURE: 115 MMHG | HEART RATE: 93 BPM | RESPIRATION RATE: 18 BRPM | TEMPERATURE: 98.6 F | BODY MASS INDEX: 24.48 KG/M2 | HEIGHT: 62 IN | WEIGHT: 133 LBS

## 2022-11-16 DIAGNOSIS — M77.8 TENDINITIS OF RIGHT WRIST: Primary | ICD-10-CM

## 2022-11-16 PROCEDURE — G8420 CALC BMI NORM PARAMETERS: HCPCS | Performed by: STUDENT IN AN ORGANIZED HEALTH CARE EDUCATION/TRAINING PROGRAM

## 2022-11-16 PROCEDURE — G8427 DOCREV CUR MEDS BY ELIG CLIN: HCPCS | Performed by: STUDENT IN AN ORGANIZED HEALTH CARE EDUCATION/TRAINING PROGRAM

## 2022-11-16 PROCEDURE — 20606 DRAIN/INJ JOINT/BURSA W/US: CPT | Performed by: STUDENT IN AN ORGANIZED HEALTH CARE EDUCATION/TRAINING PROGRAM

## 2022-11-16 PROCEDURE — G9717 DOC PT DX DEP/BP F/U NT REQ: HCPCS | Performed by: STUDENT IN AN ORGANIZED HEALTH CARE EDUCATION/TRAINING PROGRAM

## 2022-11-16 PROCEDURE — 99214 OFFICE O/P EST MOD 30 MIN: CPT | Performed by: STUDENT IN AN ORGANIZED HEALTH CARE EDUCATION/TRAINING PROGRAM

## 2022-11-16 RX ORDER — OMEPRAZOLE 40 MG/1
CAPSULE, DELAYED RELEASE ORAL
COMMUNITY
Start: 2022-11-08

## 2022-11-16 RX ORDER — BETAMETHASONE SODIUM PHOSPHATE AND BETAMETHASONE ACETATE 3; 3 MG/ML; MG/ML
3 INJECTION, SUSPENSION INTRA-ARTICULAR; INTRALESIONAL; INTRAMUSCULAR; SOFT TISSUE ONCE
Qty: 0.5 ML | Refills: 0
Start: 2022-11-16 | End: 2022-11-17

## 2022-11-16 NOTE — TELEPHONE ENCOUNTER
Patient was wondering if she needs a referral for her colonoscopy. She has already scheduled one for December 19th at [facility].    I see there was a referral for a colonoscopy screening from September but I wasn't sure if that was the one she needed to use.

## 2022-11-16 NOTE — PROGRESS NOTES
Identified Patient with two Patient identifiers (Name and ). Two Patient Identifiers confirmed. Reviewed record in preparation for visit and have obtained necessary documentation. Chief Complaint   Patient presents with    Wrist Pain     Follow up on wrist pain, patient seen 22 and told to follow up with [facility] if not better. Pain has improved, but is still there. Patient is wearing a splint at night for her wrist. Currently pain 0/10, pain only present with certain movements/touch. Visit Vitals  /68 (BP 1 Location: Right arm, BP Patient Position: Sitting, BP Cuff Size: Adult)   Pulse 93   Temp 98.6 °F (37 °C) (Oral)   Resp 18   Ht 5' 2\" (1.575 m)   Wt 133 lb (60.3 kg)   SpO2 97%   BMI 24.33 kg/m²       1. Have you been to the ER, urgent care clinic since your last visit? Hospitalized since your last visit? No    2. Have you seen or consulted any other health care providers outside of the [address] [place] [address] since your last visit? Include any pap smears or colon screening.  No

## 2022-11-16 NOTE — TELEPHONE ENCOUNTER
Called pt and asked her to call her insurance and see if they required a referral.  She will let us know if she needs a referral.

## 2022-11-17 RX ORDER — BETAMETHASONE SODIUM PHOSPHATE AND BETAMETHASONE ACETATE 3; 3 MG/ML; MG/ML
3 INJECTION, SUSPENSION INTRA-ARTICULAR; INTRALESIONAL; INTRAMUSCULAR; SOFT TISSUE ONCE
Qty: 0.5 ML | Refills: 0
Start: 2022-11-17 | End: 2022-11-17

## 2023-02-22 ENCOUNTER — OFFICE VISIT (OUTPATIENT)
Dept: FAMILY MEDICINE CLINIC | Age: 48
End: 2023-02-22

## 2023-02-22 VITALS
OXYGEN SATURATION: 98 % | RESPIRATION RATE: 18 BRPM | WEIGHT: 134 LBS | DIASTOLIC BLOOD PRESSURE: 66 MMHG | SYSTOLIC BLOOD PRESSURE: 110 MMHG | HEIGHT: 62 IN | BODY MASS INDEX: 24.66 KG/M2 | HEART RATE: 76 BPM | TEMPERATURE: 98.2 F

## 2023-02-22 DIAGNOSIS — H61.23 BILATERAL IMPACTED CERUMEN: Primary | ICD-10-CM

## 2023-02-22 NOTE — PROGRESS NOTES
Chief Complaint   Patient presents with    Wax in Ear     Patient with ear discomfort, feels like right ear is clogged. Subjective   Marilia Chicas is an 49 y.o. female who presents for ear pain. Frequent ear clogs, occurs at least 1x/year. Wears ear buds. No recent illness, no sick contacts. Usually gets her ears flushed when this happens and this resolves the issue. Dizzy spell last Tuesday. Lasted for several hours. Felt like room-spinning sensation that was worse with any movement. Had associated nausea but no vomiting. Has hx head trauma. Has tried dramamine prophylactically in the past which helps. Seeing ENT next month. Review of Systems   See HPI     Allergies   No Known Allergies    Medications  Current Outpatient Medications   Medication Sig    omeprazole (PRILOSEC) 40 mg capsule TAKE 1 CAPSULE BY MOUTH EVERY DAY IN THE MORNING    dextroamphetamine-amphetamine (ADDERALL) 20 mg tablet TAKE 1 TABLET BY MOUTH TWICE A DAY (7/28)    cyclobenzaprine (FLEXERIL) 5 mg tablet TAKE 1 TABLET BY MOUTH NIGHTLY    buPROPion SR (WELLBUTRIN SR) 100 mg SR tablet Wellbutrin  mg tablet, 12 hr sustained-release   Take 1 tablet 3 times a day by oral route. ALPRAZolam (XANAX) 0.5 mg tablet TAKE 1-2 TABLETS BY MOUTH AS NEEDED    perphenazine (TRILAFON) 4 mg tablet Take 4 mg by mouth nightly. zolpidem (AMBIEN) 10 mg tablet take 10 mg by mouth nightly. No current facility-administered medications for this visit.        Medical History  Past Medical History:   Diagnosis Date    ADHD     Anxiety disorder 5/26/2009    Depression 5/26/2009    Endometriosis 5/26/2009    Fibromyalgia 5/26/2009    Fibromyalgia     Fibromyalgia 1990    Insomnia 5/26/2009       Immunizations   Immunization History   Administered Date(s) Administered    COVID-19, MODERNA BLUE border, Primary or Immunocompromised, (age 18y+), IM, 100 mcg/0.5mL 04/02/2021, 04/30/2021, 12/28/2021    Influenza Vaccine Intradermal PF 10/16/2014 Influenza Vaccine PF 11/09/2018    Influenza, FLUARIX, FLULAVAL, FLUZONE (age 10 mo+) AND AFLURIA, (age 1 y+), PF, 0.5mL 09/26/2022    Tdap 06/13/2015       Objective   Vital Signs  Visit Vitals  /66 (BP 1 Location: Right arm, BP Patient Position: Sitting, BP Cuff Size: Small adult)   Pulse 76   Temp 98.2 °F (36.8 °C) (Oral)   Resp 18   Ht 5' 2\" (1.575 m)   Wt 134 lb (60.8 kg)   SpO2 98%   BMI 24.51 kg/m²       Physical Examination  Physical Exam  Constitutional:       Appearance: Normal appearance. She is not ill-appearing. HENT:      Right Ear: External ear normal. There is impacted cerumen. Left Ear: External ear normal. There is impacted cerumen. Nose: Nose normal.      Mouth/Throat:      Mouth: Mucous membranes are moist.      Pharynx: Oropharynx is clear. Eyes:      Conjunctiva/sclera: Conjunctivae normal.      Pupils: Pupils are equal, round, and reactive to light. Cardiovascular:      Rate and Rhythm: Normal rate and regular rhythm. Pulses: Normal pulses. Heart sounds: Normal heart sounds. Pulmonary:      Effort: Pulmonary effort is normal.      Breath sounds: Normal breath sounds. Musculoskeletal:      Cervical back: Normal range of motion and neck supple. Neurological:      Mental Status: She is alert. Assessment and Plan   Neri Stinson is a 49 y.o. who presents for b/l cerumen impaction. 1. Bilateral impacted cerumen  Ears flushed in office with successful removal of cerumen impaction b/l. TM clear b/l post procedure. Discussed vestibular PT for symptoms of dizziness, however patient wishes to discuss with ENT first. Has upcoming appt. - [place] REMOVAL IMPACTED CERUMEN INSTRUMENTATION UNILAT           I have discussed the aforementioned diagnoses and plan with the patient in detail. I have provided information in person and/or in AVS. All questions answered prior to discharge.       I discussed this patient with Dr. Mary Ronquillo (Attending Physician) Signed By:  Davion Vernon DO    Family Medicine Resident

## 2023-02-22 NOTE — PROGRESS NOTES
Identified Patient with two Patient identifiers (Name and ). Two Patient Identifiers confirmed. Reviewed record in preparation for visit and have obtained necessary documentation. Chief Complaint   Patient presents with    Wax in Ear     Patient with ear discomfort, feels like right ear is clogged. Visit Vitals  /66 (BP 1 Location: Right arm, BP Patient Position: Sitting, BP Cuff Size: Small adult)   Pulse 76   Temp 98.2 °F (36.8 °C) (Oral)   Resp 18   Ht 5' 2\" (1.575 m)   Wt 134 lb (60.8 kg)   SpO2 98%   BMI 24.51 kg/m²       1. Have you been to the ER, urgent care clinic since your last visit? Hospitalized since your last visit? No    2. Have you seen or consulted any other health care providers outside of the [address] [place] [address] since your last visit? Include any pap smears or colon screening.  No

## 2023-02-28 ENCOUNTER — OFFICE VISIT (OUTPATIENT)
Dept: FAMILY MEDICINE CLINIC | Age: 48
End: 2023-02-28
Payer: MEDICARE

## 2023-02-28 VITALS
OXYGEN SATURATION: 98 % | DIASTOLIC BLOOD PRESSURE: 60 MMHG | BODY MASS INDEX: 24.33 KG/M2 | WEIGHT: 133 LBS | HEART RATE: 102 BPM | SYSTOLIC BLOOD PRESSURE: 93 MMHG | TEMPERATURE: 98.4 F

## 2023-02-28 DIAGNOSIS — N64.4 BREAST PAIN, RIGHT: Primary | ICD-10-CM

## 2023-02-28 PROCEDURE — G8420 CALC BMI NORM PARAMETERS: HCPCS | Performed by: STUDENT IN AN ORGANIZED HEALTH CARE EDUCATION/TRAINING PROGRAM

## 2023-02-28 PROCEDURE — G9717 DOC PT DX DEP/BP F/U NT REQ: HCPCS | Performed by: STUDENT IN AN ORGANIZED HEALTH CARE EDUCATION/TRAINING PROGRAM

## 2023-02-28 PROCEDURE — G8427 DOCREV CUR MEDS BY ELIG CLIN: HCPCS | Performed by: STUDENT IN AN ORGANIZED HEALTH CARE EDUCATION/TRAINING PROGRAM

## 2023-02-28 PROCEDURE — 99213 OFFICE O/P EST LOW 20 MIN: CPT | Performed by: STUDENT IN AN ORGANIZED HEALTH CARE EDUCATION/TRAINING PROGRAM

## 2023-02-28 NOTE — PROGRESS NOTES
Identified pt with two pt identifiers(name and ). Reviewed record in preparation for visit and have obtained necessary documentation. All patient medications has been reviewed. Chief Complaint   Patient presents with    Breast pain     Right breast, right axilla pain x 3 days 2 out 10 right now - mammogram last nov was normal      Visit Vitals  BP 93/60   Pulse (!) 102   Temp 98.4 °F (36.9 °C) (Oral)   Wt 133 lb (60.3 kg)   SpO2 98%   BMI 24.33 kg/m²     1. Have you been to the ER, urgent care clinic since your last visit? Hospitalized since your last visit? No    2. Have you seen or consulted any other health care providers outside of the [address] [place] [address] since your last visit? Include any pap smears or colon screening.  No

## 2023-02-28 NOTE — PROGRESS NOTES
Tawana Manzano is an 49 y.o. female who presents for right breast pain    #right breast pain  - started Saturday night  - drying herself off after a shower and thinks she tweaked it, felt like the top rolled down to the bottom  - after that roll it has been painful any time the arms hits the breast  - no discharge  - no skin changes  - doesn't feel a lump but feels thicker  - no fever, no weight loss  - no other sxs, no cough, SOB< CP, abdominal pain N/v/d/c  - mammogram was normal in November     Allergies   No Known Allergies      Medications   Current Outpatient Medications   Medication Sig    omeprazole (PRILOSEC) 40 mg capsule TAKE 1 CAPSULE BY MOUTH EVERY DAY IN THE MORNING    dextroamphetamine-amphetamine (ADDERALL) 20 mg tablet TAKE 1 TABLET BY MOUTH TWICE A DAY (7/28)    buPROPion SR (WELLBUTRIN SR) 100 mg SR tablet Wellbutrin  mg tablet, 12 hr sustained-release   Take 1 tablet 3 times a day by oral route. ALPRAZolam (XANAX) 0.5 mg tablet TAKE 1-2 TABLETS BY MOUTH AS NEEDED    perphenazine (TRILAFON) 4 mg tablet Take 4 mg by mouth nightly. zolpidem (AMBIEN) 10 mg tablet take 10 mg by mouth nightly. No current facility-administered medications for this visit. Past surgical, medical and social history reviewed and updated as relevant to presenting concerns. ROS: See HPI      OBJECTIVE  Visit Vitals  BP 93/60   Pulse (!) 102   Temp 98.4 °F (36.9 °C) (Oral)   Wt 133 lb (60.3 kg)   SpO2 98%   BMI 24.33 kg/m²       Physical Exam  General: No acute distress. HEENT: Conjunctiva are clear, sclera non-icteric. Respiratory: Normal work of breathing, talking in full sentences without issue  Musculoskeletal: Normal gait. Skin: No abnormalities or rashes   Neuro: Alert, oriented, speech clear and coherent  Psychiatric: Normal mood and affect  Breast exam: chaperoned by [facility] LPN. Left breast no palpable LN, masses, skin change or discharge.  Right breast no skin change, discharge, no discrete mass but some swelling upper outer quadrant      ASSESSMENT & PLAN  1. Breast pain, right  Given recent normal US, will start with ultrasound of the chest. Possible muscle strain as differential, but if this is non-diagnostic and pain persists will pursue diagnostic mammogram.   - US BREAST RT COMPLETE 4 QUAD; Future            I have discussed the diagnosis with the patient and the intended plan as seen in the above orders. Patient verbalized understanding of the plan and agrees with the plan. The patient has received an after-visit summary and questions were answered concerning future plans. I have discussed medication side effects and warnings with the patient as well. Informed patient to return to the office if new symptoms arise.         Carl Carranza MD

## 2023-03-29 ENCOUNTER — OFFICE VISIT (OUTPATIENT)
Dept: FAMILY MEDICINE CLINIC | Age: 48
End: 2023-03-29

## 2023-03-29 VITALS
HEIGHT: 62 IN | TEMPERATURE: 98.1 F | RESPIRATION RATE: 20 BRPM | OXYGEN SATURATION: 98 % | WEIGHT: 134 LBS | SYSTOLIC BLOOD PRESSURE: 110 MMHG | HEART RATE: 88 BPM | BODY MASS INDEX: 24.66 KG/M2 | DIASTOLIC BLOOD PRESSURE: 73 MMHG

## 2023-03-29 DIAGNOSIS — H81.11 BENIGN PAROXYSMAL POSITIONAL VERTIGO OF RIGHT EAR: Primary | ICD-10-CM

## 2023-03-29 DIAGNOSIS — D18.01 HEMANGIOMA OF SKIN: ICD-10-CM

## 2023-03-29 DIAGNOSIS — L73.9 FOLLICULITIS: ICD-10-CM

## 2023-03-29 RX ORDER — MECLIZINE HYDROCHLORIDE 25 MG/1
25 TABLET ORAL
Qty: 30 TABLET | Refills: 0 | Status: SHIPPED | OUTPATIENT
Start: 2023-03-29 | End: 2023-04-08

## 2023-03-29 NOTE — PROGRESS NOTES
Identified Patient with two Patient identifiers (Name and ). Two Patient Identifiers confirmed. Reviewed record in preparation for visit and have obtained necessary documentation. Chief Complaint   Patient presents with    Skin Problem     Patient with red bumps that keep coming and going since going to [place] last year. Sometimes they are itchy but not painful. Visit Vitals  /73 (BP 1 Location: Right arm, BP Patient Position: Sitting, BP Cuff Size: Adult)   Pulse 88   Temp 98.1 °F (36.7 °C) (Oral)   Resp 20   Ht 5' 2\" (1.575 m)   Wt 134 lb (60.8 kg)   SpO2 98%   BMI 24.51 kg/m²       1. Have you been to the ER, urgent care clinic since your last visit? Hospitalized since your last visit? No    2. Have you seen or consulted any other health care providers outside of the [address] [place] [address] since your last visit? Include any pap smears or colon screening.  No

## 2023-03-29 NOTE — PROGRESS NOTES
[address] [place] [address]      Chief Complaint:     Chief Complaint   Patient presents with    Skin Problem     Patient with red bumps that keep coming and going since going to [place] last year. Sometimes they are itchy but not painful. Cate Morales is a 49 y.o. female that presents for: Skin lesion      Assessment/Plan:   I personally reviewed the following Pertinent Labs/Studies:   - Encounter notes, labs, and imaging from 8/29/2022. Patient is a relatively healthy 79-year-old female with known anxiety presenting with concerns of skin lesions and dizziness. Diagnoses and all orders for this visit:    1. Benign paroxysmal positional vertigo of right ear: Patient reports multiple episodes of intermittent dizziness over the past several weeks. Per the patient, she has had onset of symptoms after being in a cold pool, and will often notice them when changing positions. She endorses symptoms of the room spinning. And this sensation is often accompanied by nausea however the patient has not had vomiting. Of note, the patient denies symptoms of loss of vision or \"blacking out\" and does not endorse syncopal episodes or falls. On physical exam, there is a subjectively positive Greenwich-Hallpike maneuver bilaterally however more pronounced to the right. Based on the patient's symptoms this appears most likely to be BPPV and less likely related to orthostasis, however should symptoms persist despite physical therapy with Cawthorne exercises or medical therapy with as needed meclizine, further evaluation for orthostasis may be warranted, or further evaluation for potential central/neurologic etiology or evaluation for Ménière's disease.  -     meclizine (ANTIVERT) 25 mg tablet; Take 1 Tablet by mouth three (3) times daily as needed for Dizziness for up to 10 days.  - Patient provided with Cawthorne exercises to perform daily until symptom resolution    2.  Hemangioma of skin: Patient with concern of raised bump on scalp. She notes that she has scratched the bump and has not had any bleeding. On visual examination the lesion is pink, minimally raised, and approximately 1 mm in diameter. There are no rolled borders, central ulceration, melanocytosis, or asymmetry, and no evidence of bleeding or scabbing. Appearance is most consistent with a benign hemangioma and of note the patient has what appears to be a benign cherry hemangioma of similar appearance on her upper back. At this time would not recommend further intervention including biopsy or cryotherapy and would recommend only observation for change in size or character. 3. Folliculitis: Patient reports having been in a hot tub within the prior month and breaking out in several bumps over her trunk. The patient self diagnosed herself as having hot tub/pseudomonal folliculitis. She did not seek out therapy and it appears that the folliculitis resolved on its own without intervention. However, she does report an isolated bump/skin lesion under her left breast and on examination it does have the appearance of a pimple consistent with an isolated folliculitic lesion that I anticipate will resolve on its own. Follow up: Follow-up and Dispositions    Return if symptoms worsen or fail to improve. Subjective:   HPI:  Olivia Holguin is a 49 y.o. female that presents for:    Several months of recurrent \"bumps\" over her body that seem to have begun after [place] water park last [place]. The patient notes that for the most part these lesions have not been persistent however did have a period of self diagnosed hot tub folliculitis that resolved without intervention after being in a hot tub last month. Today her concerns consist primarily of a bump under her left breast that appears similar to a bump that she had under her right breast that after \"popping\" left a purpleish lesion.   She also reports a raised bump on her scalp that she has occasionally scratched but that has not caused any bleeding, pain, or itching, however being that she is not able to see it she would like for it to be evaluated. The reported lesions under the left breast and on the scalp seem to be asymptomatic and the patient denies any constitutional symptoms of fever, chills, fatigue, weakness, night sweats, weight loss, or diffuse dermatologic involvement. Additionally the patient reports several episodes of dizziness over the past several months that seem primarily to be brought on by positional changes as well as after being submerged in water. She reports a sensation of the room spinning and this sensation is accompanied by nausea but not vomiting. During these instances the patient may also feel somewhat sweaty. She denies any falls or loss of consciousness, headache, nausea, vomiting, chest pain, or palpitations. Health Maintenance:  Health Maintenance Due   Topic Date Due    Colorectal Cancer Screening Combo  Never done    Medicare Yearly Exam  Never done    COVID-19 Vaccine (4 - Booster for Moderna series) 02/22/2022        ROS   Per HPI. Past medical history, social history, and medications personally reviewed. Past Medical History:   Diagnosis Date    ADHD     Anxiety disorder 5/26/2009    Depression 5/26/2009    Endometriosis 5/26/2009    Fibromyalgia 5/26/2009    Fibromyalgia     Fibromyalgia 1990    Insomnia 5/26/2009        Allergies personally reviewed. No Known Allergies       Objective:   Vitals reviewed.   Visit Vitals  /73 (BP 1 Location: Right arm, BP Patient Position: Sitting, BP Cuff Size: Adult)   Pulse 88   Temp 98.1 °F (36.7 °C) (Oral)   Resp 20   Ht 5' 2\" (1.575 m)   Wt 134 lb (60.8 kg)   SpO2 98%   BMI 24.51 kg/m²        Wt Readings from Last 3 Encounters:   03/29/23 134 lb (60.8 kg)   02/28/23 133 lb (60.3 kg)   02/22/23 134 lb (60.8 kg)        Physical Exam  Constitutional:       General: She is not in acute distress. Appearance: Normal appearance. Cardiovascular:      Rate and Rhythm: Normal rate and regular rhythm. Pulses: Normal pulses. Heart sounds: Normal heart sounds. No murmur heard. Pulmonary:      Effort: Pulmonary effort is normal.      Breath sounds: Normal breath sounds. No wheezing or rales. Abdominal:      General: Abdomen is flat. Bowel sounds are normal.      Palpations: Abdomen is soft. Skin:     General: Skin is warm and dry. Capillary Refill: Capillary refill takes less than 2 seconds. Comments: Single, 1 to 2 mm white papule with a very minimal erythematous base is noted under the left breast.  A 1 mm in diameter hemangioma is noted on the scalp. Neurological:      General: No focal deficit present. Mental Status: She is alert and oriented to person, place, and time. I have reviewed pertinent labs results and other data. I have discussed the diagnosis with the patient and the intended plan as seen in the above orders. The patient has received an after-visit summary and questions were answered concerning future plans. I have discussed medication side effects and warnings with the patient as well.     Pt discussed with Dr. Noah Quick (attending physician)    Bandar Reich MD  03/29/23

## 2023-05-22 RX ORDER — OMEPRAZOLE 40 MG/1
CAPSULE, DELAYED RELEASE ORAL
COMMUNITY
Start: 2022-11-08

## 2023-05-22 RX ORDER — PERPHENAZINE 4 MG/1
1 TABLET ORAL NIGHTLY
COMMUNITY
Start: 2022-01-14

## 2023-05-22 RX ORDER — ALPRAZOLAM 0.5 MG/1
TABLET ORAL
COMMUNITY
Start: 2022-01-10

## 2023-05-22 RX ORDER — BUPROPION HYDROCHLORIDE 100 MG/1
TABLET, EXTENDED RELEASE ORAL
COMMUNITY

## 2023-05-22 RX ORDER — ZOLPIDEM TARTRATE 10 MG/1
1 TABLET ORAL NIGHTLY
COMMUNITY

## 2023-05-22 RX ORDER — DEXTROAMPHETAMINE SACCHARATE, AMPHETAMINE ASPARTATE, DEXTROAMPHETAMINE SULFATE AND AMPHETAMINE SULFATE 5; 5; 5; 5 MG/1; MG/1; MG/1; MG/1
TABLET ORAL
COMMUNITY
Start: 2022-09-15

## 2023-06-05 ENCOUNTER — NURSE TRIAGE (OUTPATIENT)
Dept: OTHER | Facility: CLINIC | Age: 48
End: 2023-06-05

## 2023-06-05 NOTE — TELEPHONE ENCOUNTER
[facility]    Received call from [facility] at [facility] with [employer]. Subjective: Caller states \"I'm not having any symptoms at all. I came in around the 1st part of April, with the same thing, with swollen lymph nodes on either side of my throat. She did some blood work and I was positive for mono. She said the lymph nodes should go down. They are still the same. The left is bigger than the right, like it was before, but it didn't go down at all. \"     Current Symptoms: swollen lymph nodes in neck    Patient was seen in office by PCP on 4/10/23 and dx with Mono. NO new or worsening symptoms, lymph node swelling just NOT improving. Care advice provided, patient verbalizes understanding; denies any other questions or concerns; instructed to call back for any new or worsening symptoms. Writer provided warm transfer to [employer] at [facility] for further care. Attention Provider: Thank you for allowing me to participate in the care of your patient. The patient was connected to triage in response to information provided to the ECC/PSC. Please do not respond through this encounter as the response is not directed to a shared pool. Reason for Disposition   Caller has already spoken with the PCP and has no further questions.     Protocols used: No Contact or Duplicate Contact Call-ADULT-

## 2023-06-06 ENCOUNTER — OFFICE VISIT (OUTPATIENT)
Age: 48
End: 2023-06-06
Payer: MEDICARE

## 2023-06-06 VITALS
TEMPERATURE: 98.7 F | BODY MASS INDEX: 24.51 KG/M2 | SYSTOLIC BLOOD PRESSURE: 106 MMHG | HEART RATE: 97 BPM | OXYGEN SATURATION: 98 % | DIASTOLIC BLOOD PRESSURE: 64 MMHG | WEIGHT: 134 LBS

## 2023-06-06 DIAGNOSIS — R59.0 CERVICAL LYMPHADENOPATHY: Primary | ICD-10-CM

## 2023-06-06 PROCEDURE — 99214 OFFICE O/P EST MOD 30 MIN: CPT | Performed by: STUDENT IN AN ORGANIZED HEALTH CARE EDUCATION/TRAINING PROGRAM

## 2023-06-06 PROCEDURE — G8420 CALC BMI NORM PARAMETERS: HCPCS | Performed by: STUDENT IN AN ORGANIZED HEALTH CARE EDUCATION/TRAINING PROGRAM

## 2023-06-06 PROCEDURE — G8427 DOCREV CUR MEDS BY ELIG CLIN: HCPCS | Performed by: STUDENT IN AN ORGANIZED HEALTH CARE EDUCATION/TRAINING PROGRAM

## 2023-06-06 ASSESSMENT — ENCOUNTER SYMPTOMS
RHINORRHEA: 0
DIARRHEA: 0
ABDOMINAL PAIN: 0
COUGH: 0
SHORTNESS OF BREATH: 0
VOMITING: 0
CONSTIPATION: 0
BLOOD IN STOOL: 0
ABDOMINAL DISTENTION: 1

## 2023-06-06 NOTE — PATIENT INSTRUCTIONS
Please call   Yanick Adler, [address] [facility]   [place] [address] [place] [address] [address] [facility] [address], [address]   [phone]

## 2023-06-06 NOTE — PROGRESS NOTES
[address] [facility]   [address] [address]  Tel: [phone]  Fax: [phone]    Chief Complaint:   Chief Complaint   Patient presents with    Mass       Subjective  Ismael Root is an 49 y.o. female who presents for painless lymphadenopathy since April, 2023. She was diagnosed with mononucleosis back in April. She has night sweats every night that started last week. Several months ago she had similar episodes of night sweats which resolved previously. She wakes up really warm and diaphoretic. Mostly at night, not frequent during the day. LMP: some spotting, no regular menstrual period has IUD in place    Following with OB- GYN,  November of last year had normal breast mammography. Patient is up-to-date on her Pap smears. Maternal grandmother had breast cancer. There is no history of colon cancer. Stopped smoking cigarettes in 2009. Smoked for 20 years. Social alcohol use, rarely. Smokes medical marijuana twice per day sometimes TID for the past 15 years. Review of Systems   Constitutional:  Positive for fatigue (chronic, has fibromyalgia). Negative for chills, fever and unexpected weight change. HENT:  Negative for rhinorrhea and sneezing. Respiratory:  Negative for cough and shortness of breath. Cardiovascular:  Negative for chest pain. Gastrointestinal:  Positive for abdominal distention. Negative for abdominal pain, blood in stool, constipation, diarrhea and vomiting. Genitourinary:  Negative for dysuria and hematuria. Musculoskeletal:         Neck stiffness and tightness when she is anxious mostly on the right side. Neurological:  Positive for dizziness (for the past month) and headaches (chronic). Hematological:  Positive for adenopathy. Does not bruise/bleed easily.          Past Medical History - reviewed:  Past Medical History:   Diagnosis Date    ADHD     Anxiety disorder

## 2023-06-07 ENCOUNTER — TELEPHONE (OUTPATIENT)
Age: 48
End: 2023-06-07

## 2023-06-07 LAB
BASOPHILS # BLD: 0.1 K/UL (ref 0–0.1)
BASOPHILS NFR BLD: 1 % (ref 0–1)
DIFFERENTIAL METHOD BLD: NORMAL
EOSINOPHIL # BLD: 0.3 K/UL (ref 0–0.4)
EOSINOPHIL NFR BLD: 4 % (ref 0–7)
ERYTHROCYTE [DISTWIDTH] IN BLOOD BY AUTOMATED COUNT: 11.7 % (ref 11.5–14.5)
HCT VFR BLD AUTO: 40.7 % (ref 35–47)
HGB BLD-MCNC: 13.6 G/DL (ref 11.5–16)
IMM GRANULOCYTES # BLD AUTO: 0 K/UL (ref 0–0.04)
IMM GRANULOCYTES NFR BLD AUTO: 0 % (ref 0–0.5)
LYMPHOCYTES # BLD: 2.9 K/UL (ref 0.8–3.5)
LYMPHOCYTES NFR BLD: 39 % (ref 12–49)
MCH RBC QN AUTO: 32 PG (ref 26–34)
MCHC RBC AUTO-ENTMCNC: 33.4 G/DL (ref 30–36.5)
MCV RBC AUTO: 95.8 FL (ref 80–99)
MONOCYTES # BLD: 0.6 K/UL (ref 0–1)
MONOCYTES NFR BLD: 8 % (ref 5–13)
NEUTS SEG # BLD: 3.7 K/UL (ref 1.8–8)
NEUTS SEG NFR BLD: 48 % (ref 32–75)
NRBC # BLD: 0 K/UL (ref 0–0.01)
NRBC BLD-RTO: 0 PER 100 WBC
PLATELET # BLD AUTO: 283 K/UL (ref 150–400)
PMV BLD AUTO: 12.3 FL (ref 8.9–12.9)
RBC # BLD AUTO: 4.25 M/UL (ref 3.8–5.2)
WBC # BLD AUTO: 7.5 K/UL (ref 3.6–11)

## 2023-06-07 NOTE — TELEPHONE ENCOUNTER
Pt stated that Dr. Stewart Carter at [facility] is OON for her insurance. She has requested that you refer her to another doctor.      Thank you

## 2023-06-08 DIAGNOSIS — R59.0 CERVICAL LYMPHADENOPATHY: Primary | ICD-10-CM

## 2023-06-08 NOTE — PROGRESS NOTES
Referrals sent to a different surgeon as requested by the patient.    Burke Lucero MD, General Surgery  [address] [place] [address] [address] [id number] [phone]    Will send [employer] message

## 2023-06-12 PROBLEM — K11.8 FULLNESS OF SUBMANDIBULAR GLAND: Status: ACTIVE | Noted: 2023-06-12

## 2023-10-11 ENCOUNTER — NURSE TRIAGE (OUTPATIENT)
Dept: OTHER | Facility: CLINIC | Age: 48
End: 2023-10-11

## 2023-10-11 NOTE — TELEPHONE ENCOUNTER
Location of patient: [address] [place] [address] [place] call from [address] at [employer] with [email or web address]. Subjective: Caller states \"Dizziness\"     Current Symptoms: A lot worse a few months ago when first came in, has gotten a lot better since last seen in march but still getting episodes at some seldom at times. If moving quickly from side to side or moving around in circular motion, feels wobbly back and forth. No symptoms at this time. Onset: March 2023; intermittent, improving    Associated Symptoms: NA    Pain Severity: Denies    Temperature: Denies    What has been tried: Motion sickness medication- helps    LMP:  IUD in place, no period  Pregnant: No    Recommended disposition: See in Office Within 2 Weeks    Care advice provided, patient verbalizes understanding; denies any other questions or concerns; instructed to call back for any new or worsening symptoms. Patient/Caller agrees with recommended disposition; writer provided warm transfer to [facility] at [facility] [facility] for appointment scheduling    Attention Provider: Thank you for allowing me to participate in the care of your patient. The patient was connected to triage in response to information provided to the ECC/PSC. Please do not respond through this encounter as the response is not directed to a shared pool.         Reason for Disposition   Dizziness not present now, but is a chronic symptom (recurrent or ongoing AND lasting > 4 weeks)    Protocols used: Dizziness-ADULT-[place]

## 2023-10-24 ENCOUNTER — OFFICE VISIT (OUTPATIENT)
Age: 48
End: 2023-10-24
Payer: MEDICARE

## 2023-10-24 VITALS
HEIGHT: 62 IN | DIASTOLIC BLOOD PRESSURE: 78 MMHG | OXYGEN SATURATION: 98 % | SYSTOLIC BLOOD PRESSURE: 134 MMHG | RESPIRATION RATE: 18 BRPM | BODY MASS INDEX: 22.71 KG/M2 | WEIGHT: 123.4 LBS | TEMPERATURE: 98.3 F | HEART RATE: 94 BPM

## 2023-10-24 DIAGNOSIS — H81.13 BENIGN PAROXYSMAL POSITIONAL VERTIGO DUE TO BILATERAL VESTIBULAR DISORDER: Primary | ICD-10-CM

## 2023-10-24 DIAGNOSIS — Z23 ENCOUNTER FOR IMMUNIZATION: ICD-10-CM

## 2023-10-24 PROCEDURE — G8420 CALC BMI NORM PARAMETERS: HCPCS | Performed by: FAMILY MEDICINE

## 2023-10-24 PROCEDURE — 90694 VACC AIIV4 NO PRSRV 0.5ML IM: CPT

## 2023-10-24 PROCEDURE — PBSHW PBB SHADOW CHARGE

## 2023-10-24 PROCEDURE — G8427 DOCREV CUR MEDS BY ELIG CLIN: HCPCS | Performed by: FAMILY MEDICINE

## 2023-10-24 PROCEDURE — 90674 CCIIV4 VAC NO PRSV 0.5 ML IM: CPT

## 2023-10-24 PROCEDURE — 99213 OFFICE O/P EST LOW 20 MIN: CPT

## 2023-10-24 PROCEDURE — G8482 FLU IMMUNIZE ORDER/ADMIN: HCPCS | Performed by: FAMILY MEDICINE

## 2023-10-24 PROCEDURE — 1036F TOBACCO NON-USER: CPT | Performed by: FAMILY MEDICINE

## 2023-10-24 PROCEDURE — G0008 ADMIN INFLUENZA VIRUS VAC: HCPCS

## 2023-10-24 RX ORDER — MECLIZINE HCL 12.5 MG/1
12.5 TABLET ORAL 3 TIMES DAILY PRN
Qty: 90 TABLET | Refills: 3 | Status: SHIPPED | OUTPATIENT
Start: 2023-10-24

## 2023-10-24 SDOH — ECONOMIC STABILITY: FOOD INSECURITY: WITHIN THE PAST 12 MONTHS, THE FOOD YOU BOUGHT JUST DIDN'T LAST AND YOU DIDN'T HAVE MONEY TO GET MORE.: SOMETIMES TRUE

## 2023-10-24 SDOH — ECONOMIC STABILITY: FOOD INSECURITY: WITHIN THE PAST 12 MONTHS, YOU WORRIED THAT YOUR FOOD WOULD RUN OUT BEFORE YOU GOT MONEY TO BUY MORE.: SOMETIMES TRUE

## 2023-10-24 SDOH — ECONOMIC STABILITY: HOUSING INSECURITY
IN THE LAST 12 MONTHS, WAS THERE A TIME WHEN YOU DID NOT HAVE A STEADY PLACE TO SLEEP OR SLEPT IN A SHELTER (INCLUDING NOW)?: NO

## 2023-10-24 SDOH — ECONOMIC STABILITY: INCOME INSECURITY: HOW HARD IS IT FOR YOU TO PAY FOR THE VERY BASICS LIKE FOOD, HOUSING, MEDICAL CARE, AND HEATING?: SOMEWHAT HARD

## 2023-10-24 ASSESSMENT — PATIENT HEALTH QUESTIONNAIRE - PHQ9
1. LITTLE INTEREST OR PLEASURE IN DOING THINGS: 0
SUM OF ALL RESPONSES TO PHQ QUESTIONS 1-9: 0
SUM OF ALL RESPONSES TO PHQ9 QUESTIONS 1 & 2: 0
SUM OF ALL RESPONSES TO PHQ QUESTIONS 1-9: 0
2. FEELING DOWN, DEPRESSED OR HOPELESS: 0

## 2023-11-06 ENCOUNTER — OFFICE VISIT (OUTPATIENT)
Age: 48
End: 2023-11-06
Payer: MEDICARE

## 2023-11-06 VITALS
OXYGEN SATURATION: 98 % | RESPIRATION RATE: 18 BRPM | BODY MASS INDEX: 22.63 KG/M2 | HEIGHT: 62 IN | TEMPERATURE: 98.4 F | HEART RATE: 87 BPM | SYSTOLIC BLOOD PRESSURE: 116 MMHG | DIASTOLIC BLOOD PRESSURE: 74 MMHG | WEIGHT: 123 LBS

## 2023-11-06 DIAGNOSIS — M79.7 FIBROMYALGIA: Primary | ICD-10-CM

## 2023-11-06 DIAGNOSIS — M62.838 OTHER MUSCLE SPASM: ICD-10-CM

## 2023-11-06 DIAGNOSIS — B36.0 TINEA VERSICOLOR: ICD-10-CM

## 2023-11-06 PROCEDURE — 99213 OFFICE O/P EST LOW 20 MIN: CPT

## 2023-11-06 PROCEDURE — G8482 FLU IMMUNIZE ORDER/ADMIN: HCPCS

## 2023-11-06 PROCEDURE — G8427 DOCREV CUR MEDS BY ELIG CLIN: HCPCS

## 2023-11-06 PROCEDURE — G8420 CALC BMI NORM PARAMETERS: HCPCS

## 2023-11-06 PROCEDURE — 1036F TOBACCO NON-USER: CPT

## 2023-11-06 RX ORDER — SELENIUM SULFIDE 2.5 MG/100ML
LOTION TOPICAL
Qty: 118 ML | Refills: 1 | Status: SHIPPED | OUTPATIENT
Start: 2023-11-06 | End: 2023-12-06

## 2023-11-06 SDOH — ECONOMIC STABILITY: INCOME INSECURITY: HOW HARD IS IT FOR YOU TO PAY FOR THE VERY BASICS LIKE FOOD, HOUSING, MEDICAL CARE, AND HEATING?: PATIENT DECLINED

## 2023-11-06 SDOH — ECONOMIC STABILITY: FOOD INSECURITY: WITHIN THE PAST 12 MONTHS, YOU WORRIED THAT YOUR FOOD WOULD RUN OUT BEFORE YOU GOT MONEY TO BUY MORE.: PATIENT DECLINED

## 2023-11-06 SDOH — ECONOMIC STABILITY: FOOD INSECURITY: WITHIN THE PAST 12 MONTHS, THE FOOD YOU BOUGHT JUST DIDN'T LAST AND YOU DIDN'T HAVE MONEY TO GET MORE.: PATIENT DECLINED

## 2023-11-06 SDOH — ECONOMIC STABILITY: HOUSING INSECURITY
IN THE LAST 12 MONTHS, WAS THERE A TIME WHEN YOU DID NOT HAVE A STEADY PLACE TO SLEEP OR SLEPT IN A SHELTER (INCLUDING NOW)?: PATIENT REFUSED

## 2023-11-06 ASSESSMENT — PATIENT HEALTH QUESTIONNAIRE - PHQ9
SUM OF ALL RESPONSES TO PHQ QUESTIONS 1-9: 1
1. LITTLE INTEREST OR PLEASURE IN DOING THINGS: 0
2. FEELING DOWN, DEPRESSED OR HOPELESS: 1
SUM OF ALL RESPONSES TO PHQ QUESTIONS 1-9: 1
SUM OF ALL RESPONSES TO PHQ9 QUESTIONS 1 & 2: 1

## 2023-11-06 NOTE — PROGRESS NOTES
[facility], [address] [facility]   Office [phone], Fax [phone]   Subjective     Chief Complaint   Patient presents with    Follow-up Chronic Condition     Vertigo has resolved. Neck pain and headaches have gotten worse over the past week. Tried OTC medications. No help. Tawny Aguilar is a 51 y.o. female who presents for vertigo follow up. Neck pain and headaches worse over the past week. Takes aleve/ibuprofen twice per day. Found at [facility] a lidocaine spray this helped. Has tension in her shoulders and neck. Lots of stress ongoing which she feels may be contributory. Vertigo resolved with home exercises. Finger discomfort resolved. Tinea versicolor: two spots of discoloration on arm that she feels have grown larger over the past couple weeks with some warmer weather    Past Medical History  Past Medical History:   Diagnosis Date    ADHD     Anxiety disorder 5/26/2009    Depression 5/26/2009    Endometriosis 5/26/2009    Fibromyalgia 5/26/2009    Fibromyalgia     Fibromyalgia 1990    Fullness of submandibular gland 6/12/2023    Insomnia 5/26/2009       Current Medications  Current Outpatient Medications   Medication Sig Dispense Refill    selenium sulfide (SELSUN) 2.5 % lotion Apply topically daily as needed. 118 mL 1    ALPRAZolam (XANAX) 0.5 MG tablet TAKE 1-2 TABLETS BY MOUTH AS NEEDED      amphetamine-dextroamphetamine (ADDERALL) 20 MG tablet TAKE 1 TABLET BY MOUTH TWICE A DAY (7/28)      buPROPion (WELLBUTRIN SR) 100 MG extended release tablet Wellbutrin  mg tablet, 12 hr sustained-release   Take 1 tablet 3 times a day by oral route. omeprazole (PRILOSEC) 40 MG delayed release capsule TAKE 1 CAPSULE BY MOUTH EVERY DAY IN THE MORNING      perphenazine 4 MG tablet Take 1 tablet by mouth nightly      zolpidem (AMBIEN) 10 MG tablet Take 1 tablet by mouth nightly. No current facility-administered medications for this visit.      Objective   Vital

## 2023-11-06 NOTE — PATIENT INSTRUCTIONS
Please do exercises daily for 10-15 minutes  Please do self-massage  You can apply the selenium blue once daily for one week.  Rinse this off after 10 minutes

## 2023-12-08 ENCOUNTER — TELEPHONE (OUTPATIENT)
Age: 48
End: 2023-12-08

## 2023-12-08 NOTE — TELEPHONE ENCOUNTER
Spoke with patient who identified pt with two pt identifiers(name and ). Patient Active Problem List   Diagnosis    Depression    Anxiety disorder    Insomnia    Endometriosis    Fibromyalgia    Pain in pelvis    Mixed stress and urge urinary incontinence    Midline cystocele    Low grade squamous intraepithelial lesion (LGSIL) on Papanicolaou smear of cervix    Irregular periods    Dyspareunia    Family history of colon cancer    Overactive bladder    Hematuria    Fullness of submandibular gland    Benign paroxysmal positional vertigo due to bilateral vestibular disorder       Chief Complaint/Subjective: Cat Bite and Scratch     Current Symptoms: NA    Associated Symptoms: NA    Onset: 2023 about 30 minutes ago    Temperature: Denies     Pet Vaccines: Up to Date    Pain Severity: 8    Location: Bite: On left hand/pinky  Scratch: Under pinky    Pain Quality: burning    What has been tried: Washed hands, wiped with peroxide, applied neosporin and Band-Aid. Recommended disposition: Continue to Monitor and notify office of any worsening of symptoms. Reason for Disposition: Patient stated that bleeding has stopped and that depth is a little deeper then a paper cut. Patient agreed and verbalized understanding to advice provided.

## 2023-12-08 NOTE — TELEPHONE ENCOUNTER
Pt called and stated that she was bitten by her cat and it is causing a lot of pain. Pt stated that she cannot go to an ED due to copay cost.  Soco Martinez was not available at the time of call. Pt requested that someone returns her phone call. She was informed that [employer] will return her phone call asap. She stated that she understood. Soco Martinez has been informed pt's medical concern.

## 2024-01-12 ENCOUNTER — TELEPHONE (OUTPATIENT)
Age: 49
End: 2024-01-12

## 2024-01-12 NOTE — TELEPHONE ENCOUNTER
Spoke with patient who identified pt with two pt identifiers(name and ).    Patient Active Problem List   Diagnosis    Depression    Anxiety disorder    Insomnia    Endometriosis    Fibromyalgia    Pain in pelvis    Mixed stress and urge urinary incontinence    Midline cystocele    Low grade squamous intraepithelial lesion (LGSIL) on Papanicolaou smear of cervix    Irregular periods    Dyspareunia    Family history of colon cancer    Overactive bladder    Hematuria    Fullness of submandibular gland    Benign paroxysmal positional vertigo due to bilateral vestibular disorder       Chief Complaint/Subjective: I tried kicking a chair with my big toe because I was angry and missed hitting my pinky toe.     Current Symptoms: swelling, pain    Associated Symptoms: NA    Onset: 24    Temperature: Denies    Pain Severity: 6    Location: Right Foot; pinky toe    Better/Worse:   worsens with: nothing  improves with: Ice, Ibuprofen    What has been tried: Ice, Ibuprofen    Recommended disposition: Continue to use Tylenol/Ibuprofen to help with pain and ice for 15-20 minute intervals as needed to site to help with swelling. Patient advised to make sure ice is wrapped in towel, pillowcase, and not to apply directly to skin. If any other questions or concerns arise, patient advised to notify office or go to nearest urgent care/emergency room for evaluation.    Patient agreed and verbalized understanding to advice provided.

## 2024-01-12 NOTE — TELEPHONE ENCOUNTER
Patient stated that she kicked a chair and missed and end up hitting her pinky toe. She stated that it is is swollen a little and does hurt. We had no availability for today. Sending over to triage for next steps.

## 2024-04-26 ENCOUNTER — OFFICE VISIT (OUTPATIENT)
Age: 49
End: 2024-04-26
Payer: MEDICARE

## 2024-04-26 VITALS
TEMPERATURE: 98.4 F | SYSTOLIC BLOOD PRESSURE: 113 MMHG | DIASTOLIC BLOOD PRESSURE: 73 MMHG | BODY MASS INDEX: 23.37 KG/M2 | HEIGHT: 62 IN | OXYGEN SATURATION: 97 % | WEIGHT: 127 LBS | HEART RATE: 96 BPM

## 2024-04-26 DIAGNOSIS — B36.0 TINEA VERSICOLOR: Primary | ICD-10-CM

## 2024-04-26 PROCEDURE — G8427 DOCREV CUR MEDS BY ELIG CLIN: HCPCS | Performed by: STUDENT IN AN ORGANIZED HEALTH CARE EDUCATION/TRAINING PROGRAM

## 2024-04-26 PROCEDURE — 99213 OFFICE O/P EST LOW 20 MIN: CPT | Performed by: STUDENT IN AN ORGANIZED HEALTH CARE EDUCATION/TRAINING PROGRAM

## 2024-04-26 PROCEDURE — 1036F TOBACCO NON-USER: CPT | Performed by: STUDENT IN AN ORGANIZED HEALTH CARE EDUCATION/TRAINING PROGRAM

## 2024-04-26 PROCEDURE — G8420 CALC BMI NORM PARAMETERS: HCPCS | Performed by: STUDENT IN AN ORGANIZED HEALTH CARE EDUCATION/TRAINING PROGRAM

## 2024-04-26 RX ORDER — FLUCONAZOLE 150 MG/1
300 TABLET ORAL WEEKLY
Qty: 4 TABLET | Refills: 0 | Status: SHIPPED | OUTPATIENT
Start: 2024-04-26 | End: 2024-05-04

## 2024-04-26 NOTE — PROGRESS NOTES
[facility] Note      History of Present Illness:     Chief Complaint   Patient presents with    Rash     Tinea vericolor rash on both arms under bra line and shoulders, has had since late teens, father has it.  Has tried athletes foot medication didn't help.  Has tried a topical and tablet medication.  Occurs when she sweats.       Yara Garcia is a 48 y.o. female with a PMH notable for fibromyalgia, anxiety who presents for recurrent tinea versicolor since she was a teenager. Has failed multiple topical medications. Given oral med many years ago that helped. Affecting anterior elbows, under the breasts, and shoulders. Itchy. Worse with hot shower and when sweaty.      PMH (REVIEWED):  Past Medical History:   Diagnosis Date    ADHD     Anxiety disorder 5/26/2009    Depression 5/26/2009    Endometriosis 5/26/2009    Fibromyalgia 5/26/2009    Fibromyalgia     Fibromyalgia 1990    Fullness of submandibular gland 6/12/2023    Insomnia 5/26/2009       Current Medications/Allergies (REVIEWED):     Current Outpatient Medications on File Prior to Visit   Medication Sig Dispense Refill    ALPRAZolam (XANAX) 0.5 MG tablet TAKE 1-2 TABLETS BY MOUTH AS NEEDED      amphetamine-dextroamphetamine (ADDERALL) 20 MG tablet TAKE 1 TABLET BY MOUTH TWICE A DAY (7/28)      buPROPion (WELLBUTRIN SR) 100 MG extended release tablet Wellbutrin  mg tablet, 12 hr sustained-release   Take 1 tablet 3 times a day by oral route.      omeprazole (PRILOSEC) 40 MG delayed release capsule TAKE 1 CAPSULE BY MOUTH EVERY DAY IN THE MORNING      perphenazine 4 MG tablet Take 1 tablet by mouth nightly      zolpidem (AMBIEN) 10 MG tablet Take 1 tablet by mouth nightly.       No current facility-administered medications on file prior to visit.       No Known Allergies      Review of Systems:     Review of Systems as per HPI    Objective:     Vitals:    04/26/24 1024   BP: 113/73   Site: Right Upper Arm   Position: Sitting 
Yara Garcia is a 48 y.o. female      Chief Complaint   Patient presents with    Rash     Tinea vericolor rash on both arms under bra line and shoulders, has had since late teens, father has it.  Has tried athletes foot medication didn't help.  Has tried a topical and tablet medication.  Occurs when she sweats.       \"Have you been to the ER, urgent care clinic since your last visit?  Hospitalized since your last visit?\"    YES - When: approximately 2  weeks ago.  Where and Why: [facility],  ear clogged.    “Have you seen or consulted any other health care providers outside of [facility] since your last visit?”    YES - When: approximately 2  weeks ago.  Where and Why: [facility] for ear clogged.        “Have you had a colorectal cancer screening such as a colonoscopy/FIT/Cologuard?    YES - Type: Colonoscopy - Where: 2022 [facility] outpatient Nurse/CMA to request most recent records if not in the chart     No colonoscopy on file  No cologuard on file  No FIT/FOBT on file   No flexible sigmoidoscopy on file         Click Here for Release of Records Request    Vitals:    04/26/24 1024   BP: 113/73   Site: Right Upper Arm   Position: Sitting   Cuff Size: Medium Adult   Pulse: 96   Temp: 98.4 °F (36.9 °C)   TempSrc: Oral   SpO2: 97%   Weight: 57.6 kg (127 lb)   Height: 1.575 m (5' 2.01\")           Medication Reconciliation Completed, changes notes. Please Update medication list.  
Improved

## 2024-05-15 ENCOUNTER — OFFICE VISIT (OUTPATIENT)
Age: 49
End: 2024-05-15
Payer: MEDICARE

## 2024-05-15 VITALS
SYSTOLIC BLOOD PRESSURE: 103 MMHG | BODY MASS INDEX: 23.04 KG/M2 | DIASTOLIC BLOOD PRESSURE: 59 MMHG | HEART RATE: 85 BPM | OXYGEN SATURATION: 98 % | TEMPERATURE: 98.3 F | WEIGHT: 126 LBS

## 2024-05-15 DIAGNOSIS — Z79.899 MEDICATION MANAGEMENT: ICD-10-CM

## 2024-05-15 DIAGNOSIS — Z00.00 MEDICARE ANNUAL WELLNESS VISIT, SUBSEQUENT: Primary | ICD-10-CM

## 2024-05-15 DIAGNOSIS — F51.04 PSYCHOPHYSIOLOGICAL INSOMNIA: ICD-10-CM

## 2024-05-15 DIAGNOSIS — Z11.59 NEED FOR HEPATITIS C SCREENING TEST: ICD-10-CM

## 2024-05-15 DIAGNOSIS — R53.83 OTHER FATIGUE: ICD-10-CM

## 2024-05-15 PROCEDURE — G0439 PPPS, SUBSEQ VISIT: HCPCS | Performed by: STUDENT IN AN ORGANIZED HEALTH CARE EDUCATION/TRAINING PROGRAM

## 2024-05-15 ASSESSMENT — PATIENT HEALTH QUESTIONNAIRE - PHQ9
SUM OF ALL RESPONSES TO PHQ QUESTIONS 1-9: 6
7. TROUBLE CONCENTRATING ON THINGS, SUCH AS READING THE NEWSPAPER OR WATCHING TELEVISION: NOT AT ALL
2. FEELING DOWN, DEPRESSED OR HOPELESS: NOT AT ALL
5. POOR APPETITE OR OVEREATING: NOT AT ALL
SUM OF ALL RESPONSES TO PHQ QUESTIONS 1-9: 6
9. THOUGHTS THAT YOU WOULD BE BETTER OFF DEAD, OR OF HURTING YOURSELF: NOT AT ALL
SUM OF ALL RESPONSES TO PHQ QUESTIONS 1-9: 6
8. MOVING OR SPEAKING SO SLOWLY THAT OTHER PEOPLE COULD HAVE NOTICED. OR THE OPPOSITE, BEING SO FIGETY OR RESTLESS THAT YOU HAVE BEEN MOVING AROUND A LOT MORE THAN USUAL: NOT AT ALL
SUM OF ALL RESPONSES TO PHQ QUESTIONS 1-9: 6
10. IF YOU CHECKED OFF ANY PROBLEMS, HOW DIFFICULT HAVE THESE PROBLEMS MADE IT FOR YOU TO DO YOUR WORK, TAKE CARE OF THINGS AT HOME, OR GET ALONG WITH OTHER PEOPLE: NOT DIFFICULT AT ALL
3. TROUBLE FALLING OR STAYING ASLEEP: NOT AT ALL
4. FEELING TIRED OR HAVING LITTLE ENERGY: NEARLY EVERY DAY
6. FEELING BAD ABOUT YOURSELF - OR THAT YOU ARE A FAILURE OR HAVE LET YOURSELF OR YOUR FAMILY DOWN: NEARLY EVERY DAY

## 2024-05-15 NOTE — PROGRESS NOTES
Identified pt with two pt identifiers(name and ). Reviewed record in preparation for visit and have obtained necessary documentation.  Chief Complaint   Patient presents with    Medicare AWV        Vitals:    05/15/24 1529   BP: (!) 103/59   Pulse: 85   Temp: 98.3 °F (36.8 °C)   TempSrc: Oral   SpO2: 98%   Weight: 57.2 kg (126 lb)         Coordination of Care Questionnaire:  :     \"Have you been to the ER, urgent care clinic since your last visit?  Hospitalized since your last visit?\"    NO    “Have you seen or consulted any other health care providers outside of [facility] since your last visit?”    NO        “Have you had a colorectal cancer screening such as a colonoscopy/FIT/Cologuard?        No colonoscopy on file  No cologuard on file  No FIT/FOBT on file   No flexible sigmoidoscopy on file         Click Here for Release of Records Request

## 2024-05-15 NOTE — PROGRESS NOTES
Medicare Annual Wellness Visit    Yara Garcia is here for Medicare AWV    Assessment & Plan   Medicare annual wellness visit, subsequent  Need for hepatitis C screening test  Medication management  -     CBC; Future  -     Basic Metabolic Panel; Future  Psychophysiological insomnia  -     TSH; Future  Other fatigue  -     TSH; Future    Insomnia - following with psychiatrist, recommend close follow up as other medications or pathology might be contributing  SI joint pain - just started today, provided exercises, return 4-6 weeks if no improvement    Colon cancer screen - completed 2022    Recommendations for Preventive Services Due: see orders and patient instructions/AVS.  Recommended screening schedule for the next 5-10 years is provided to the patient in written form: see Patient Instructions/AVS.     Return in 6 months (on 11/15/2024).     Subjective   The following acute and/or chronic problems were also addressed today:        #sleep  - lots of difficulty sleeping   - taking ambien which lets her sleep for 2-4 hours  - does get tired in the evening but then wakes up in the middle of the night  - felt wide awake   - Jenaette Maher NP at [facility]     #hip pain  - left  - just started this morning  - feels like something might come out of place    #colonoscopy  - December 2022 and she said she is good for 10 years      Patient's complete Health Risk Assessment and screening values have been reviewed and are found in Flowsheets. The following problems were reviewed today and where indicated follow up appointments were made and/or referrals ordered.    Positive Risk Factor Screenings with Interventions:          Drug Use:   Substance and Sexual Activity   Drug Use Yes    Types: Marijuana (Weed)     Interventions:  See AVS for additional education material        General HRA Questions:  Select all that apply: (!) New or Increased Pain    Pain Interventions:  See above      Activity, Diet, and Weight:  On

## 2024-05-16 LAB
ANION GAP SERPL CALC-SCNC: 5 MMOL/L (ref 5–15)
BUN SERPL-MCNC: 13 MG/DL (ref 6–20)
BUN/CREAT SERPL: 15 (ref 12–20)
CALCIUM SERPL-MCNC: 10 MG/DL (ref 8.5–10.1)
CHLORIDE SERPL-SCNC: 108 MMOL/L (ref 97–108)
CO2 SERPL-SCNC: 30 MMOL/L (ref 21–32)
CREAT SERPL-MCNC: 0.87 MG/DL (ref 0.55–1.02)
ERYTHROCYTE [DISTWIDTH] IN BLOOD BY AUTOMATED COUNT: 11.8 % (ref 11.5–14.5)
GLUCOSE SERPL-MCNC: 101 MG/DL (ref 65–100)
HCT VFR BLD AUTO: 39.2 % (ref 35–47)
HGB BLD-MCNC: 13.6 G/DL (ref 11.5–16)
MCH RBC QN AUTO: 32.3 PG (ref 26–34)
MCHC RBC AUTO-ENTMCNC: 34.7 G/DL (ref 30–36.5)
MCV RBC AUTO: 93.1 FL (ref 80–99)
NRBC # BLD: 0 K/UL (ref 0–0.01)
NRBC BLD-RTO: 0 PER 100 WBC
PLATELET # BLD AUTO: 285 K/UL (ref 150–400)
PMV BLD AUTO: 11.3 FL (ref 8.9–12.9)
POTASSIUM SERPL-SCNC: 5 MMOL/L (ref 3.5–5.1)
RBC # BLD AUTO: 4.21 M/UL (ref 3.8–5.2)
SODIUM SERPL-SCNC: 143 MMOL/L (ref 136–145)
TSH SERPL DL<=0.05 MIU/L-ACNC: 1.37 UIU/ML (ref 0.36–3.74)
WBC # BLD AUTO: 5.7 K/UL (ref 3.6–11)

## 2024-06-20 ENCOUNTER — OFFICE VISIT (OUTPATIENT)
Age: 49
End: 2024-06-20
Payer: MEDICARE

## 2024-06-20 ENCOUNTER — ANCILLARY PROCEDURE (OUTPATIENT)
Age: 49
End: 2024-06-20
Payer: MEDICARE

## 2024-06-20 VITALS
DIASTOLIC BLOOD PRESSURE: 75 MMHG | SYSTOLIC BLOOD PRESSURE: 113 MMHG | BODY MASS INDEX: 21.23 KG/M2 | HEART RATE: 86 BPM | OXYGEN SATURATION: 99 % | WEIGHT: 116.13 LBS

## 2024-06-20 DIAGNOSIS — S69.92XA FINGER INJURY, LEFT, INITIAL ENCOUNTER: Primary | ICD-10-CM

## 2024-06-20 DIAGNOSIS — S69.92XA FINGER INJURY, LEFT, INITIAL ENCOUNTER: ICD-10-CM

## 2024-06-20 PROCEDURE — 99214 OFFICE O/P EST MOD 30 MIN: CPT | Performed by: FAMILY MEDICINE

## 2024-06-20 PROCEDURE — G8427 DOCREV CUR MEDS BY ELIG CLIN: HCPCS | Performed by: FAMILY MEDICINE

## 2024-06-20 PROCEDURE — 1036F TOBACCO NON-USER: CPT | Performed by: FAMILY MEDICINE

## 2024-06-20 PROCEDURE — 73130 X-RAY EXAM OF HAND: CPT

## 2024-06-20 PROCEDURE — G8420 CALC BMI NORM PARAMETERS: HCPCS | Performed by: FAMILY MEDICINE

## 2024-06-20 RX ORDER — KETOCONAZOLE 20 MG/G
CREAM TOPICAL
COMMUNITY
Start: 2024-06-08

## 2024-06-20 ASSESSMENT — PATIENT HEALTH QUESTIONNAIRE - PHQ9
1. LITTLE INTEREST OR PLEASURE IN DOING THINGS: SEVERAL DAYS
SUM OF ALL RESPONSES TO PHQ QUESTIONS 1-9: 2
SUM OF ALL RESPONSES TO PHQ QUESTIONS 1-9: 2
SUM OF ALL RESPONSES TO PHQ9 QUESTIONS 1 & 2: 2
SUM OF ALL RESPONSES TO PHQ QUESTIONS 1-9: 2
2. FEELING DOWN, DEPRESSED OR HOPELESS: SEVERAL DAYS
SUM OF ALL RESPONSES TO PHQ QUESTIONS 1-9: 2

## 2024-07-16 ENCOUNTER — ANCILLARY PROCEDURE (OUTPATIENT)
Age: 49
End: 2024-07-16
Payer: MEDICARE

## 2024-07-16 ENCOUNTER — OFFICE VISIT (OUTPATIENT)
Age: 49
End: 2024-07-16
Payer: MEDICARE

## 2024-07-16 VITALS
BODY MASS INDEX: 21.31 KG/M2 | RESPIRATION RATE: 18 BRPM | OXYGEN SATURATION: 97 % | HEIGHT: 62 IN | DIASTOLIC BLOOD PRESSURE: 75 MMHG | SYSTOLIC BLOOD PRESSURE: 112 MMHG | WEIGHT: 115.8 LBS | TEMPERATURE: 98.9 F | HEART RATE: 117 BPM

## 2024-07-16 DIAGNOSIS — M25.551 PAIN OF RIGHT HIP: Primary | ICD-10-CM

## 2024-07-16 DIAGNOSIS — M25.551 PAIN OF RIGHT HIP: ICD-10-CM

## 2024-07-16 PROCEDURE — 99213 OFFICE O/P EST LOW 20 MIN: CPT

## 2024-07-16 PROCEDURE — 73502 X-RAY EXAM HIP UNI 2-3 VIEWS: CPT

## 2024-07-16 RX ORDER — VALACYCLOVIR HYDROCHLORIDE 500 MG/1
TABLET, FILM COATED ORAL
COMMUNITY
Start: 2024-06-26
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
## 2024-07-16 NOTE — PROGRESS NOTES
Pt is here today for RT sided hip pain that traveling down the RT leg. Pt did say that this is a recurring problem which she did get injection for about 15 yrs ago.    Identified pt with two pt identifiers(name and ). Reviewed record in preparation for visit and have obtained necessary documentation.  Chief Complaint   Patient presents with    Hip Pain     Rt Hip pain x 5days        Vitals:    24 1622   BP: 112/75   Site: Right Upper Arm   Position: Sitting   Cuff Size: Child   Pulse: (!) 117   Resp: 18   Temp: 98.9 °F (37.2 °C)   TempSrc: Oral   SpO2: 97%   Weight: 52.5 kg (115 lb 12.8 oz)   Height: 1.575 m (5' 2\")         Coordination of Care Questionnaire:  :     \"Have you been to the ER, urgent care clinic since your last visit?  Hospitalized since your last visit?\"    NO    “Have you seen or consulted any other health care providers outside of [facility] since your last visit?”    NO    Have you had a mammogram?”   Yes at [facility]    Date of last Mammogram: 2021         “Have you had a colorectal cancer screening such as a colonoscopy/FIT/Cologuard?    Yes  pt can not remember by who at the moment but will send a my chart message with doctor's info    No colonoscopy on file  No cologuard on file  No FIT/FOBT on file   No flexible sigmoidoscopy on file         Click Here for Release of Records Request

## 2024-07-30 ENCOUNTER — OFFICE VISIT (OUTPATIENT)
Age: 49
End: 2024-07-30
Payer: MEDICARE

## 2024-07-30 VITALS
SYSTOLIC BLOOD PRESSURE: 134 MMHG | WEIGHT: 115.8 LBS | TEMPERATURE: 97.9 F | OXYGEN SATURATION: 99 % | DIASTOLIC BLOOD PRESSURE: 72 MMHG | BODY MASS INDEX: 21.31 KG/M2 | HEART RATE: 99 BPM | RESPIRATION RATE: 18 BRPM | HEIGHT: 62 IN

## 2024-07-30 DIAGNOSIS — M79.18 MYALGIA, OTHER SITE: ICD-10-CM

## 2024-07-30 DIAGNOSIS — M62.830 BACK SPASM: ICD-10-CM

## 2024-07-30 DIAGNOSIS — R76.8 HEPATITIS B CORE ANTIBODY POSITIVE: Primary | ICD-10-CM

## 2024-07-30 PROCEDURE — 99214 OFFICE O/P EST MOD 30 MIN: CPT | Performed by: FAMILY MEDICINE

## 2024-07-30 PROCEDURE — 20552 NJX 1/MLT TRIGGER POINT 1/2: CPT | Performed by: FAMILY MEDICINE

## 2024-07-30 PROCEDURE — 1036F TOBACCO NON-USER: CPT | Performed by: FAMILY MEDICINE

## 2024-07-30 PROCEDURE — G8420 CALC BMI NORM PARAMETERS: HCPCS | Performed by: FAMILY MEDICINE

## 2024-07-30 PROCEDURE — G8427 DOCREV CUR MEDS BY ELIG CLIN: HCPCS | Performed by: FAMILY MEDICINE

## 2024-07-30 ASSESSMENT — PATIENT HEALTH QUESTIONNAIRE - PHQ9
5. POOR APPETITE OR OVEREATING: MORE THAN HALF THE DAYS
SUM OF ALL RESPONSES TO PHQ QUESTIONS 1-9: 14
4. FEELING TIRED OR HAVING LITTLE ENERGY: NEARLY EVERY DAY
6. FEELING BAD ABOUT YOURSELF - OR THAT YOU ARE A FAILURE OR HAVE LET YOURSELF OR YOUR FAMILY DOWN: NOT AT ALL
SUM OF ALL RESPONSES TO PHQ QUESTIONS 1-9: 14
1. LITTLE INTEREST OR PLEASURE IN DOING THINGS: SEVERAL DAYS
3. TROUBLE FALLING OR STAYING ASLEEP: NEARLY EVERY DAY
SUM OF ALL RESPONSES TO PHQ QUESTIONS 1-9: 14
8. MOVING OR SPEAKING SO SLOWLY THAT OTHER PEOPLE COULD HAVE NOTICED. OR THE OPPOSITE, BEING SO FIGETY OR RESTLESS THAT YOU HAVE BEEN MOVING AROUND A LOT MORE THAN USUAL: NOT AT ALL
2. FEELING DOWN, DEPRESSED OR HOPELESS: MORE THAN HALF THE DAYS
10. IF YOU CHECKED OFF ANY PROBLEMS, HOW DIFFICULT HAVE THESE PROBLEMS MADE IT FOR YOU TO DO YOUR WORK, TAKE CARE OF THINGS AT HOME, OR GET ALONG WITH OTHER PEOPLE: NOT DIFFICULT AT ALL
SUM OF ALL RESPONSES TO PHQ QUESTIONS 1-9: 14
9. THOUGHTS THAT YOU WOULD BE BETTER OFF DEAD, OR OF HURTING YOURSELF: NOT AT ALL
SUM OF ALL RESPONSES TO PHQ9 QUESTIONS 1 & 2: 3
7. TROUBLE CONCENTRATING ON THINGS, SUCH AS READING THE NEWSPAPER OR WATCHING TELEVISION: NEARLY EVERY DAY

## 2024-07-30 ASSESSMENT — ANXIETY QUESTIONNAIRES
IF YOU CHECKED OFF ANY PROBLEMS ON THIS QUESTIONNAIRE, HOW DIFFICULT HAVE THESE PROBLEMS MADE IT FOR YOU TO DO YOUR WORK, TAKE CARE OF THINGS AT HOME, OR GET ALONG WITH OTHER PEOPLE: VERY DIFFICULT
2. NOT BEING ABLE TO STOP OR CONTROL WORRYING: NEARLY EVERY DAY
GAD7 TOTAL SCORE: 15
7. FEELING AFRAID AS IF SOMETHING AWFUL MIGHT HAPPEN: SEVERAL DAYS
1. FEELING NERVOUS, ANXIOUS, OR ON EDGE: SEVERAL DAYS
5. BEING SO RESTLESS THAT IT IS HARD TO SIT STILL: SEVERAL DAYS
6. BECOMING EASILY ANNOYED OR IRRITABLE: NEARLY EVERY DAY
4. TROUBLE RELAXING: NEARLY EVERY DAY
3. WORRYING TOO MUCH ABOUT DIFFERENT THINGS: NEARLY EVERY DAY

## 2024-07-30 NOTE — PROGRESS NOTES
[facility] Residency   [facility]      Chief Complaint:   Chief Complaint   Patient presents with    Hip Pain     Right  hip pain     HPI:  Yara Garcia is a 48 y.o. female who presents for Right hip/low back pain     The pain is located in the right lower back and will occasionally radiated into the buttocks.  No injury or trauma.  She has an appointment a couple weeks ago, they did an Xray and recommended Aleve along with home exercises. She takes aleve and ibuprofen which helps control the pain. Some days she has no pain. She has not been able to do the exercises but notes improvement in the pain when she is walking but notices the pain again when she sits down. She had a similar presentation about 20 years ago and she had two SI joint injections which helped with the pain.  No fever.  No incontinence. No saddle anesthesia.      Patient was told that she is Hep B core antibody positive after donating blood recently.  She is currently asymptomatic.     PMHx:   Past Medical History:   Diagnosis Date    ADHD     Anxiety disorder 5/26/2009    Depression 5/26/2009    Endometriosis 5/26/2009    Fibromyalgia 5/26/2009    Fibromyalgia     Fibromyalgia 1990    Fullness of submandibular gland 6/12/2023    Insomnia 5/26/2009     Meds:   Current Outpatient Medications   Medication Sig Dispense Refill    valACYclovir (VALTREX) 500 MG tablet TAJE 1 (ONE) TABLET TWO TIMES DAILY, AS NEEDED FOR OUTBREAKS      ketoconazole (NIZORAL) 2 % cream 1 APPLICATION TO AFFECTED AREA ON ARMS EXTERNALLY TWICE A DAY DURING FLARES 14 DAYS      ALPRAZolam (XANAX) 0.5 MG tablet TAKE 1-2 TABLETS BY MOUTH AS NEEDED      amphetamine-dextroamphetamine (ADDERALL) 20 MG tablet TAKE 1 TABLET BY MOUTH TWICE A DAY (7/28)      buPROPion (WELLBUTRIN SR) 100 MG extended release tablet Take 2 tablets by mouth 2 times daily      omeprazole (PRILOSEC) 40 MG delayed release capsule TAKE 1

## 2024-07-30 NOTE — PROGRESS NOTES
Follow up for RT hip pain.       Identified pt with two pt identifiers(name and ). Reviewed record in preparation for visit and have obtained necessary documentation.  Chief Complaint   Patient presents with    Hip Pain     Right  hip pain        Vitals:    24 1109   BP: 134/72   Site: Right Upper Arm   Position: Sitting   Cuff Size: Medium Adult   Pulse: 99   Resp: 18   Temp: 97.9 °F (36.6 °C)   TempSrc: Oral   SpO2: 99%   Weight: 52.5 kg (115 lb 12.8 oz)   Height: 1.575 m (5' 2\")         Coordination of Care Questionnaire:  :     \"Have you been to the ER, urgent care clinic since your last visit?  Hospitalized since your last visit?\"    NO                   Click Here for Release of Records Request    No treatment at this time. Observation recommended.

## 2024-08-01 ENCOUNTER — TELEPHONE (OUTPATIENT)
Age: 49
End: 2024-08-01

## 2024-08-01 NOTE — TELEPHONE ENCOUNTER
Pt stated that she asked you to provide her with paper that gives directions on how to do 2 stretches that you demonstrated. However, when she arrived home, she did not see the instructions of the stretches that you instructed her to do. She is requesting that you leave the appropriate instructions at the  for her to  on Monday.    Thank you

## 2024-08-05 ENCOUNTER — TELEPHONE (OUTPATIENT)
Age: 49
End: 2024-08-05

## 2024-08-05 NOTE — TELEPHONE ENCOUNTER
Patient came in to request a phone call or message about her x-ray results, I have printed out her results. She did mention that the tech mentioned that they saw something metal, and was wondering if that was ever figured out.    She would like a message or phone call regarding those results.

## 2024-08-06 ENCOUNTER — TELEPHONE (OUTPATIENT)
Age: 49
End: 2024-08-06

## 2024-08-06 NOTE — TELEPHONE ENCOUNTER
----- Message from Nick Howard MD sent at 8/6/2024  3:23 PM EDT -----  Please call patient and tell her the Hep B test is positive.  The liver test were normal.  Please schedule a follow up visit to discuss and do additional testing. Thanks.

## 2024-08-19 ENCOUNTER — OFFICE VISIT (OUTPATIENT)
Age: 49
End: 2024-08-19
Payer: MEDICARE

## 2024-08-19 ENCOUNTER — LAB (OUTPATIENT)
Age: 49
End: 2024-08-19

## 2024-08-19 VITALS
TEMPERATURE: 98.2 F | SYSTOLIC BLOOD PRESSURE: 99 MMHG | RESPIRATION RATE: 18 BRPM | DIASTOLIC BLOOD PRESSURE: 66 MMHG | BODY MASS INDEX: 21.64 KG/M2 | HEART RATE: 83 BPM | OXYGEN SATURATION: 98 % | WEIGHT: 117.6 LBS | HEIGHT: 62 IN

## 2024-08-19 DIAGNOSIS — M62.838 MUSCLE SPASM: ICD-10-CM

## 2024-08-19 DIAGNOSIS — M79.10 MYALGIA: ICD-10-CM

## 2024-08-19 DIAGNOSIS — R76.8 HEPATITIS B CORE ANTIBODY POSITIVE: Primary | ICD-10-CM

## 2024-08-19 LAB
HBV SURFACE AB SER QL: REACTIVE
HBV SURFACE AB SER-ACNC: 206.33 MIU/ML

## 2024-08-19 PROCEDURE — 20552 NJX 1/MLT TRIGGER POINT 1/2: CPT | Performed by: FAMILY MEDICINE

## 2024-08-19 PROCEDURE — 99214 OFFICE O/P EST MOD 30 MIN: CPT | Performed by: FAMILY MEDICINE

## 2024-08-19 RX ORDER — TESTOSTERONE CYPIONATE 200 MG/ML
2 INJECTION INTRAMUSCULAR ONCE
Status: COMPLETED | OUTPATIENT
Start: 2024-08-19 | End: 2024-08-19

## 2024-08-19 RX ORDER — LIDOCAINE HYDROCHLORIDE 10 MG/ML
1 INJECTION, SOLUTION INFILTRATION; PERINEURAL ONCE
Status: COMPLETED | OUTPATIENT
Start: 2024-08-19 | End: 2024-08-19

## 2024-08-19 RX ADMIN — LIDOCAINE HYDROCHLORIDE 1 ML: 10 INJECTION, SOLUTION INFILTRATION; PERINEURAL at 13:22

## 2024-08-19 RX ADMIN — DEXAMETHASONE SODIUM PHOSPHATE 2 MG: 4 INJECTION, SOLUTION INTRAMUSCULAR; INTRAVENOUS at 13:20

## 2024-08-19 NOTE — PROGRESS NOTES
[facility]   [facility]      Chief Complaint:   Chief Complaint   Patient presents with    Follow-up       SUBJECTIVE:  Yara Garcia is a 48 y.o. female who presents for     Hep B core Ab positive: Patient was first notified after donating blood and Hep B core Ab positive on labs from last visit 2 weeks ago.  Hep C ab negative.  Hep B surface Ag negative.  LFTs WNL.  She is currently asymptomatic.     Right glut pain: She was seen for left lower back and hip pain 2 weeks ago.  She had a trigger point in the lower back that helped with the back pain but not the glut pain.  No radicular sx.  She reports doing HEP.      PMHx:  Past Medical History:   Diagnosis Date    ADHD     Anxiety disorder 5/26/2009    Depression 5/26/2009    Endometriosis 5/26/2009    Fibromyalgia 5/26/2009    Fibromyalgia     Fibromyalgia 1990    Fullness of submandibular gland 6/12/2023    Insomnia 5/26/2009       Meds:   Current Outpatient Medications   Medication Sig Dispense Refill    valACYclovir (VALTREX) 500 MG tablet TAJE 1 (ONE) TABLET TWO TIMES DAILY, AS NEEDED FOR OUTBREAKS      ketoconazole (NIZORAL) 2 % cream 1 APPLICATION TO AFFECTED AREA ON ARMS EXTERNALLY TWICE A DAY DURING FLARES 14 DAYS      ALPRAZolam (XANAX) 0.5 MG tablet TAKE 1-2 TABLETS BY MOUTH AS NEEDED      amphetamine-dextroamphetamine (ADDERALL) 20 MG tablet TAKE 1 TABLET BY MOUTH TWICE A DAY (7/28)      buPROPion (WELLBUTRIN SR) 100 MG extended release tablet Take 2 tablets by mouth 2 times daily      omeprazole (PRILOSEC) 40 MG delayed release capsule TAKE 1 CAPSULE BY MOUTH EVERY DAY IN THE MORNING      perphenazine 4 MG tablet Take 1 tablet by mouth nightly      zolpidem (AMBIEN) 10 MG tablet Take 1 tablet by mouth nightly.       No current facility-administered medications for this visit.       Allergies:   No Known Allergies    Smoker:  Social History     Tobacco Use   Smoking Status

## 2024-08-19 NOTE — PROGRESS NOTES
Pt is here to discuss lab results. Pt is still also having some RT hip pain, she was given an 2024, she would like to know if she can have another injection to the area.     Identified pt with two pt identifiers(name and ). Reviewed record in preparation for visit and have obtained necessary documentation.  Chief Complaint   Patient presents with    Follow-up        Vitals:    24 1125   BP: 99/66   Site: Left Upper Arm   Position: Sitting   Cuff Size: Medium Adult   Pulse: 83   Resp: 18   Temp: 98.2 °F (36.8 °C)   TempSrc: Oral   SpO2: 98%   Weight: 53.3 kg (117 lb 9.6 oz)   Height: 1.575 m (5' 2\")         Coordination of Care Questionnaire:  :     \"Have you been to the ER, urgent care clinic since your last visit?  Hospitalized since your last visit?\"    NO    “Have you seen or consulted any other health care providers outside of [facility] since your last visit?”    NO    Have you had a mammogram?”   YES - Where: [facility] [place] Nurse/CMA to request most recent records if not in the chart    Date of last Mammogram: 2021         “Have you had a colorectal cancer screening such as a colonoscopy/FIT/Cologuard?    YES - Type: Colonoscopy - Where: [facility] in  Nurse/CMA to request most recent records if not in the chart     No colonoscopy on file  No cologuard on file  No FIT/FOBT on file   No flexible sigmoidoscopy on file         Click Here for Release of Records Request

## 2024-08-20 LAB
HBV CORE AB SERPL QL IA: POSITIVE
HBV E AG SERPL QL IA: NEGATIVE

## 2024-08-21 LAB
HBV IU/ML: NORMAL IU/ML
HEPATITIS C GENOTYPE: NORMAL
LOG10 HBV IU/ML: NORMAL LOG10 IU/ML
TEST INFORMATION: NORMAL

## 2024-09-19 ENCOUNTER — TELEPHONE (OUTPATIENT)
Age: 49
End: 2024-09-19

## 2024-09-30 ENCOUNTER — OFFICE VISIT (OUTPATIENT)
Age: 49
End: 2024-09-30
Payer: MEDICARE

## 2024-09-30 VITALS
WEIGHT: 127.2 LBS | HEIGHT: 62 IN | OXYGEN SATURATION: 98 % | TEMPERATURE: 98.2 F | BODY MASS INDEX: 23.41 KG/M2 | SYSTOLIC BLOOD PRESSURE: 89 MMHG | DIASTOLIC BLOOD PRESSURE: 48 MMHG | HEART RATE: 78 BPM | RESPIRATION RATE: 18 BRPM

## 2024-09-30 DIAGNOSIS — R76.8 HEPATITIS B CORE ANTIBODY POSITIVE: Primary | ICD-10-CM

## 2024-09-30 PROCEDURE — 1036F TOBACCO NON-USER: CPT | Performed by: FAMILY MEDICINE

## 2024-09-30 PROCEDURE — 3017F COLORECTAL CA SCREEN DOC REV: CPT | Performed by: FAMILY MEDICINE

## 2024-09-30 PROCEDURE — 99213 OFFICE O/P EST LOW 20 MIN: CPT | Performed by: FAMILY MEDICINE

## 2024-09-30 PROCEDURE — G8420 CALC BMI NORM PARAMETERS: HCPCS | Performed by: FAMILY MEDICINE

## 2024-09-30 PROCEDURE — G8427 DOCREV CUR MEDS BY ELIG CLIN: HCPCS | Performed by: FAMILY MEDICINE

## 2024-09-30 ASSESSMENT — PATIENT HEALTH QUESTIONNAIRE - PHQ9
1. LITTLE INTEREST OR PLEASURE IN DOING THINGS: SEVERAL DAYS
SUM OF ALL RESPONSES TO PHQ9 QUESTIONS 1 & 2: 2
SUM OF ALL RESPONSES TO PHQ QUESTIONS 1-9: 2
2. FEELING DOWN, DEPRESSED OR HOPELESS: SEVERAL DAYS
SUM OF ALL RESPONSES TO PHQ QUESTIONS 1-9: 2

## 2024-09-30 NOTE — PROGRESS NOTES
Identified pt with two pt identifiers(name and ). Reviewed record in preparation for visit and have obtained necessary documentation.  Chief Complaint   Patient presents with    Results        Vitals:    24 1328   BP: (!) 89/48   Site: Right Upper Arm   Position: Sitting   Cuff Size: Medium Adult   Pulse: 78   Resp: 18   Temp: 98.2 °F (36.8 °C)   TempSrc: Oral   SpO2: 98%   Weight: 57.7 kg (127 lb 3.2 oz)   Height: 1.575 m (5' 2\")         Coordination of Care Questionnaire:  :     \"Have you been to the ER, urgent care clinic since your last visit?  Hospitalized since your last visit?\"    NO    “Have you seen or consulted any other health care providers outside of [facility] since your last visit?”    Yes Hand specialist, psychologist, Gastro             Click Here for Release of Records Request   
  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  Hep B E Ag and Surface Ag are negative.  HBV DNA was not detected.  LFTs are normal.  Appears to be a prior infection due to Ab's being positive but currently inactive at Ag's and DNA are negative.  Hep C Ab nonreactive.  Will refer to Hepatology for additional evaluation.     RTC: 1 year and PRN

## 2024-10-10 ENCOUNTER — CLINICAL DOCUMENTATION (OUTPATIENT)
Age: 49
End: 2024-10-10

## 2024-10-10 NOTE — PROGRESS NOTES
10/10/2024 0848: New pt referral received. Referred by Nick Howard MD. Records in [facility]. Dx: pos hep b core antibody. Routine appt

## 2024-11-21 ENCOUNTER — PATIENT MESSAGE (OUTPATIENT)
Age: 49
End: 2024-11-21

## 2024-12-09 ENCOUNTER — OFFICE VISIT (OUTPATIENT)
Age: 49
End: 2024-12-09
Payer: MEDICARE

## 2024-12-09 VITALS
TEMPERATURE: 97.8 F | HEIGHT: 62 IN | WEIGHT: 124.2 LBS | RESPIRATION RATE: 18 BRPM | HEART RATE: 89 BPM | BODY MASS INDEX: 22.86 KG/M2 | SYSTOLIC BLOOD PRESSURE: 128 MMHG | OXYGEN SATURATION: 95 % | DIASTOLIC BLOOD PRESSURE: 83 MMHG

## 2024-12-09 DIAGNOSIS — B37.0 ORAL CANDIDIASIS: ICD-10-CM

## 2024-12-09 DIAGNOSIS — R42 DIZZINESS: Primary | ICD-10-CM

## 2024-12-09 PROCEDURE — 99213 OFFICE O/P EST LOW 20 MIN: CPT | Performed by: STUDENT IN AN ORGANIZED HEALTH CARE EDUCATION/TRAINING PROGRAM

## 2024-12-09 RX ORDER — NYSTATIN 100000 [USP'U]/ML
500000 SUSPENSION ORAL 4 TIMES DAILY
Qty: 200 ML | Refills: 0 | Status: SHIPPED | OUTPATIENT
Start: 2024-12-09 | End: 2024-12-19

## 2024-12-09 RX ORDER — BUPROPION HYDROCHLORIDE 200 MG/1
400 TABLET, EXTENDED RELEASE ORAL DAILY
COMMUNITY
Start: 2024-09-21

## 2024-12-09 RX ORDER — FLUTICASONE PROPIONATE 50 MCG
2 SPRAY, SUSPENSION (ML) NASAL DAILY
Qty: 16 G | Refills: 0 | Status: SHIPPED | OUTPATIENT
Start: 2024-12-09

## 2024-12-09 RX ORDER — MECLIZINE HCL 12.5 MG 12.5 MG/1
12.5 TABLET ORAL 3 TIMES DAILY PRN
Qty: 30 TABLET | Refills: 0 | Status: SHIPPED | OUTPATIENT
Start: 2024-12-09 | End: 2024-12-09 | Stop reason: CLARIF

## 2024-12-09 SDOH — ECONOMIC STABILITY: FOOD INSECURITY: WITHIN THE PAST 12 MONTHS, YOU WORRIED THAT YOUR FOOD WOULD RUN OUT BEFORE YOU GOT MONEY TO BUY MORE.: OFTEN TRUE

## 2024-12-09 SDOH — ECONOMIC STABILITY: INCOME INSECURITY: HOW HARD IS IT FOR YOU TO PAY FOR THE VERY BASICS LIKE FOOD, HOUSING, MEDICAL CARE, AND HEATING?: VERY HARD

## 2024-12-09 SDOH — ECONOMIC STABILITY: FOOD INSECURITY: WITHIN THE PAST 12 MONTHS, THE FOOD YOU BOUGHT JUST DIDN'T LAST AND YOU DIDN'T HAVE MONEY TO GET MORE.: OFTEN TRUE

## 2024-12-09 ASSESSMENT — PATIENT HEALTH QUESTIONNAIRE - PHQ9
SUM OF ALL RESPONSES TO PHQ9 QUESTIONS 1 & 2: 2
1. LITTLE INTEREST OR PLEASURE IN DOING THINGS: SEVERAL DAYS
SUM OF ALL RESPONSES TO PHQ QUESTIONS 1-9: 2
SUM OF ALL RESPONSES TO PHQ QUESTIONS 1-9: 2
2. FEELING DOWN, DEPRESSED OR HOPELESS: SEVERAL DAYS
SUM OF ALL RESPONSES TO PHQ QUESTIONS 1-9: 2
SUM OF ALL RESPONSES TO PHQ QUESTIONS 1-9: 2

## 2024-12-09 NOTE — PATIENT INSTRUCTIONS
[facility] Financial Resources*  (Call [employer]/211 if need more resources.)    Medical Care  [facility] Financial Assistance  What they offer: The [employer] Financial Assistance Program helps uninsured patients who do not qualify for government-sponsored health insurance and cannot afford to pay for their medical care. Insured patients may also qualify for assistance based on family income, family size, and medical needs.  Phone Number: [phone]  How to apply for the [employer] Financial Assistance Program:  Option 1: To apply for financial assistance, a patient (or their family or other provider) should fill out the Financial Assistance Application. Copies of the Financial Assistance Application and the FAP may be obtained for free by calling the [facility] customer service department at [phone].  Option 2: The Financial Assistance Application and policy may be obtained for free by downloading a copy from the [employer] website:  https://www.[employer]/patient-resources/financial-assistance  Applications are available in several languages on the website    [facility] Financial Assistance  What they offer: [facility] has a Financial Assistance Policy that provides free or discounted health care to qualified patients.  Website:  https://[employer]/patient-financial/pricing  Phone Number for Patient Benefit Advisors: [phone]    [facility] Financial Assistance  What they offer: Help with understanding a bill, finding out what insurance pays, applying for financial aid, or setting up a payment plan.  Website:  https://[employer]/services/billing-insurance/bnhwyhnyx-xdyvflacvt-ytphqfvlypm  Financial Counseling Call Center: [phone]      [employer]  What they offer:   Mobile healthcare resources for uninsured patients.  Website:  https://www.[employer]/[facility]-care-a-van  Contact: [phone] between 7:00 - 8:30 am to schedule same

## 2024-12-12 NOTE — PROGRESS NOTES
I discussed the findings, assessment and plan with the resident and agree with the resident's findings and plan as documented in the resident's note.  
Pt states since 2024 she has been feeling dizzy, and RT ear fullness.    Identified pt with two pt identifiers(name and ). Reviewed record in preparation for visit and have obtained necessary documentation.  Chief Complaint   Patient presents with    Dizziness     Since 2024        Vitals:    24 1404 24 1425 24 1426 24 1427   BP: 101/67 103/69 121/82 128/83   Site: Right Upper Arm Left Upper Arm Left Upper Arm Right Upper Arm   Position: Sitting Supine Sitting Sitting   Cuff Size: Medium Adult Medium Adult Medium Adult Medium Adult   Pulse: 89      Resp: 18      Temp: 97.8 °F (36.6 °C)      TempSrc: Oral      SpO2: 95%      Weight: 56.3 kg (124 lb 3.2 oz)      Height: 1.575 m (5' 2\")            Coordination of Care Questionnaire:  :     \"Have you been to the ER, urgent care clinic since your last visit?  Hospitalized since your last visit?\"    NO    “Have you seen or consulted any other health care providers outside of [facility] since your last visit?”    NO            Click Here for Release of Records Request   
(7/28)      omeprazole (PRILOSEC) 40 MG delayed release capsule TAKE 1 CAPSULE BY MOUTH EVERY DAY IN THE MORNING      perphenazine 4 MG tablet Take 1 tablet by mouth nightly      buPROPion (WELLBUTRIN SR) 100 MG extended release tablet Take 2 tablets by mouth 2 times daily (Patient not taking: Reported on 12/9/2024)      zolpidem (AMBIEN) 10 MG tablet Take 1 tablet by mouth nightly. (Patient not taking: Reported on 12/9/2024)       No current facility-administered medications for this visit.       Allergies:   No Known Allergies    Smoker:  Social History     Tobacco Use   Smoking Status Former    Current packs/day: 0.50    Types: Cigarettes   Smokeless Tobacco Never       ETOH:   Social History     Substance and Sexual Activity   Alcohol Use No       FH:   Family History   Problem Relation Age of Onset    Coronary Art Dis Father         s/p CABG    Heart Disease Father     Alcohol Abuse Father     Gout Father     Substance Abuse Father     Depression Mother     Osteoporosis Mother     Breast Cancer Maternal Grandmother     Cancer Maternal Grandmother     Colon Cancer Maternal Grandmother     Alcohol Abuse Paternal Grandmother     Stroke Paternal Grandmother     Substance Abuse Paternal Grandmother     Alcohol Abuse Paternal Aunt     Substance Abuse Paternal Aunt     Depression Sister        ROS:  General/Constitutional:   No headache, fever, fatigue, weight loss or weight gain       Eyes:   No visual changes      Ears:    No changes     Mouth: white patch on tongue    Neck:   No pain, or limited movement     Cardiac:    No chest pain      Respiratory:   No cough or shortness of breath     GI:   No nausea/vomiting, diarrhea, abdominal pain       :   No dysuria or  hematuria    Neurological:   Dizziness. No problems with balance, or unilateral weakness     Skin: No rash     Physical Exam:  /83 (Site: Right Upper Arm, Position: Sitting, Cuff Size: Medium Adult)   Pulse 89   Temp 97.8 °F (36.6 °C) (Oral)

## 2024-12-19 NOTE — PROGRESS NOTES
[facility]   [facility] Sports Medicine      Chief Complaint:   Chief Complaint   Patient presents with    Finger Pain     Left ring finger         Subjective:      Yara Garcia is a 48 y.o. female seen for evaluation and treatment of her left ring finger.     Left ring finger pain for 1 week after slamming finger in a door.  Pain and swelling over the DIP joint.  Decreased ROM.  No numbness.    H/o left ring finger fracture as a child while playing basketball that was treated conservatively.      Meds:   Current Outpatient Medications   Medication Sig Dispense Refill    ketoconazole (NIZORAL) 2 % cream 1 APPLICATION TO AFFECTED AREA ON ARMS EXTERNALLY TWICE A DAY DURING FLARES 14 DAYS      ALPRAZolam (XANAX) 0.5 MG tablet TAKE 1-2 TABLETS BY MOUTH AS NEEDED      amphetamine-dextroamphetamine (ADDERALL) 20 MG tablet TAKE 1 TABLET BY MOUTH TWICE A DAY (7/28)      buPROPion (WELLBUTRIN SR) 100 MG extended release tablet Take 2 tablets by mouth 2 times daily      omeprazole (PRILOSEC) 40 MG delayed release capsule TAKE 1 CAPSULE BY MOUTH EVERY DAY IN THE MORNING      perphenazine 4 MG tablet Take 1 tablet by mouth nightly      zolpidem (AMBIEN) 10 MG tablet Take 1 tablet by mouth nightly.       No current facility-administered medications for this visit.       Allergies:   No Known Allergies    Smoker:  Social History     Tobacco Use   Smoking Status Former    Current packs/day: 0.50    Types: Cigarettes   Smokeless Tobacco Never       ETOH:   Social History     Substance and Sexual Activity   Alcohol Use No       FH:   Family History   Problem Relation Age of Onset    Coronary Art Dis Father         s/p CABG    Heart Disease Father     Alcohol Abuse Father     Gout Father     Substance Abuse Father     Depression Mother     Osteoporosis Mother     Breast Cancer Maternal Grandmother     Cancer Maternal Grandmother     Colon Cancer Maternal Grandmother  
Patient states she's been having some finger pain for about a week now. She states she was at [employer] and tried closing the door and closed her finger in the door. She states its hard to ball a fist up without the pain and  swelling and very tender on certain areas of the finger with touch. She's taking ibuprofen for the pain. She did ice instantly after the incident. She did state when she was younger she was playing and her finger got kicked while playing basketball by her sister and was broken, She was seen and they splinted her left ring finger also.    Chief Complaint   Patient presents with    Finger Pain     Left ring finger     Vitals:    06/20/24 0826   BP: 113/75   Pulse: 86   SpO2: 99%      
[facility] provides services at a reduced cost to those who are determined to be eligible through [facility]’s financial assistance program. Information regarding [facility]’s financial assistance program can be found by going to [email or web address] or by calling [phone].

## 2025-01-07 NOTE — TELEPHONE ENCOUNTER
Medication Refill Request    Yara Garcia is requesting a refill of the following medication(s):   Requested Prescriptions     Pending Prescriptions Disp Refills    fluticasone (FLONASE) 50 MCG/ACT nasal spray [Pharmacy Med Name: FLUTICASONE PROP 50 MCG SPRAY]       Sig: SPRAY 2 SPRAYS INTO EACH NOSTRIL EVERY DAY        Listed PCP is Carlos Hunt MD   Last provider to prescribe medication: Dr. Ellis on 12/09/24  Date of Last Office Visit at [facility]: 12/09/24 with Dr. Ellis    FUTURE APPOINTMENT: No future appointments.    Please send refill to:      [facility]/pharmacy #[id number] - [place], [place] - [address] [place] [address] -  [phone] - F [phone]  [address] [place] [id number]  Phone: [phone] Fax: [phone]      Please review request and approve or deny with recommendations within 48 hours.

## 2025-01-08 RX ORDER — FLUTICASONE PROPIONATE 50 MCG
2 SPRAY, SUSPENSION (ML) NASAL DAILY
Qty: 1 EACH | Refills: 3 | Status: SHIPPED | OUTPATIENT
Start: 2025-01-08

## 2025-05-05 ENCOUNTER — OFFICE VISIT (OUTPATIENT)
Age: 50
End: 2025-05-05
Payer: MEDICARE

## 2025-05-05 VITALS
TEMPERATURE: 98 F | HEIGHT: 62 IN | DIASTOLIC BLOOD PRESSURE: 75 MMHG | WEIGHT: 115.2 LBS | BODY MASS INDEX: 21.2 KG/M2 | SYSTOLIC BLOOD PRESSURE: 128 MMHG | RESPIRATION RATE: 18 BRPM | OXYGEN SATURATION: 98 % | HEART RATE: 85 BPM

## 2025-05-05 DIAGNOSIS — J34.89 NOSE DRYNESS: Primary | ICD-10-CM

## 2025-05-05 PROCEDURE — 99213 OFFICE O/P EST LOW 20 MIN: CPT | Performed by: STUDENT IN AN ORGANIZED HEALTH CARE EDUCATION/TRAINING PROGRAM

## 2025-05-05 RX ORDER — FLUTICASONE PROPIONATE 50 MCG
1 SPRAY, SUSPENSION (ML) NASAL DAILY PRN
COMMUNITY

## 2025-05-05 NOTE — PROGRESS NOTES
[facility] Family [facility] Res[facility]   [facility] Sports Medicine      Chief Complaint:   Chief Complaint   Patient presents with    Foreign Body in Nose       SUBJECTIVE:  Yara Garcia is a 49 y.o. female who presents for nose complaint. States noticing something in her nose 1-2 months ago that seemed to be a bump or a booger. She initially tried to pull it out but instead would pull out nose hairs. Denies pain, epistaxis. States shaving her nose hairs. States blowing her nose yesterday and noticed a clear tear drop shaped substance that had a black line through the center. She placed it on a tissue and states that it started disintegrating. She has been using saline nasal spray and flonase. No history of current symptoms.     PMHx:  Past Medical History:   Diagnosis Date    ADHD     Anxiety disorder 5/26/2009    Depression 5/26/2009    Endometriosis 5/26/2009    Fibromyalgia 5/26/2009    Fibromyalgia     Fibromyalgia 1990    Fullness of submandibular gland 6/12/2023    Insomnia 5/26/2009       Meds:   Current Outpatient Medications   Medication Sig Dispense Refill    fluticasone (FLONASE) 50 MCG/ACT nasal spray 1 spray by Each Nostril route daily as needed for Rhinitis      buPROPion (WELLBUTRIN SR) 200 MG extended release tablet Take 150 mg by mouth daily      valACYclovir (VALTREX) 500 MG tablet Take 1 tablet by mouth daily      ketoconazole (NIZORAL) 2 % cream 1 APPLICATION TO AFFECTED AREA ON ARMS EXTERNALLY TWICE A DAY DURING FLARES 14 DAYS      ALPRAZolam (XANAX) 0.5 MG tablet TAKE 1-2 TABLETS BY MOUTH AS NEEDED      amphetamine-dextroamphetamine (ADDERALL) 20 MG tablet TAKE 1 TABLET BY MOUTH TWICE A DAY (7/28)      omeprazole (PRILOSEC) 40 MG delayed release capsule TAKE 1 CAPSULE BY MOUTH EVERY DAY IN THE MORNING      perphenazine 4 MG tablet Take 1 tablet by mouth nightly       No current facility-administered medications for this visit.       Allergies:   No Known

## 2025-05-05 NOTE — PROGRESS NOTES
Identified pt with two pt identifiers(name and ). Reviewed record in preparation for visit and have obtained necessary documentation.  Chief Complaint   Patient presents with    Foreign Body in Nose        Vitals:    25 1512 25 1529   BP: (!) 144/83 128/75   BP Site: Right Upper Arm Right Upper Arm   Patient Position: Sitting Sitting   BP Cuff Size: Small Adult Small Adult   Pulse: (!) 104 85   Resp: 18    Temp: 98 °F (36.7 °C)    TempSrc: Temporal    SpO2: 98%    Weight: 52.3 kg (115 lb 3.2 oz)    Height: 1.575 m (5' 2\")          Coordination of Care Questionnaire:  :     \"Have you been to the ER, urgent care clinic since your last visit?  Hospitalized since your last visit?\"    NO    “Have you seen or consulted any other health care providers outside of [facility] since your last visit?”    NO            Click Here for Release of Records Request

## 2025-05-12 ENCOUNTER — OFFICE VISIT (OUTPATIENT)
Age: 50
End: 2025-05-12
Payer: MEDICARE

## 2025-05-12 VITALS
TEMPERATURE: 98.5 F | DIASTOLIC BLOOD PRESSURE: 74 MMHG | OXYGEN SATURATION: 97 % | HEIGHT: 62 IN | WEIGHT: 115 LBS | SYSTOLIC BLOOD PRESSURE: 112 MMHG | BODY MASS INDEX: 21.16 KG/M2 | HEART RATE: 96 BPM

## 2025-05-12 DIAGNOSIS — B37.0 OROPHARYNGEAL CANDIDIASIS: Primary | ICD-10-CM

## 2025-05-12 DIAGNOSIS — Z13.1 ENCOUNTER FOR SCREENING FOR DIABETES MELLITUS: ICD-10-CM

## 2025-05-12 PROCEDURE — 99214 OFFICE O/P EST MOD 30 MIN: CPT

## 2025-05-12 RX ORDER — FLUCONAZOLE 100 MG/1
200 TABLET ORAL DAILY
Qty: 28 TABLET | Refills: 0 | Status: SHIPPED | OUTPATIENT
Start: 2025-05-12 | End: 2025-05-26

## 2025-05-12 RX ORDER — TRAZODONE HYDROCHLORIDE 50 MG/1
TABLET ORAL
COMMUNITY
Start: 2025-02-25 | End: 2025-05-16 | Stop reason: ALTCHOICE

## 2025-05-12 SDOH — ECONOMIC STABILITY: FOOD INSECURITY: WITHIN THE PAST 12 MONTHS, YOU WORRIED THAT YOUR FOOD WOULD RUN OUT BEFORE YOU GOT MONEY TO BUY MORE.: PATIENT DECLINED

## 2025-05-12 SDOH — ECONOMIC STABILITY: FOOD INSECURITY: WITHIN THE PAST 12 MONTHS, THE FOOD YOU BOUGHT JUST DIDN'T LAST AND YOU DIDN'T HAVE MONEY TO GET MORE.: PATIENT DECLINED

## 2025-05-12 ASSESSMENT — PATIENT HEALTH QUESTIONNAIRE - PHQ9
6. FEELING BAD ABOUT YOURSELF - OR THAT YOU ARE A FAILURE OR HAVE LET YOURSELF OR YOUR FAMILY DOWN: MORE THAN HALF THE DAYS
10. IF YOU CHECKED OFF ANY PROBLEMS, HOW DIFFICULT HAVE THESE PROBLEMS MADE IT FOR YOU TO DO YOUR WORK, TAKE CARE OF THINGS AT HOME, OR GET ALONG WITH OTHER PEOPLE: SOMEWHAT DIFFICULT
SUM OF ALL RESPONSES TO PHQ QUESTIONS 1-9: 19
SUM OF ALL RESPONSES TO PHQ QUESTIONS 1-9: 19
3. TROUBLE FALLING OR STAYING ASLEEP: NEARLY EVERY DAY
7. TROUBLE CONCENTRATING ON THINGS, SUCH AS READING THE NEWSPAPER OR WATCHING TELEVISION: NEARLY EVERY DAY
8. MOVING OR SPEAKING SO SLOWLY THAT OTHER PEOPLE COULD HAVE NOTICED. OR THE OPPOSITE, BEING SO FIGETY OR RESTLESS THAT YOU HAVE BEEN MOVING AROUND A LOT MORE THAN USUAL: SEVERAL DAYS
SUM OF ALL RESPONSES TO PHQ QUESTIONS 1-9: 18
4. FEELING TIRED OR HAVING LITTLE ENERGY: NEARLY EVERY DAY
2. FEELING DOWN, DEPRESSED OR HOPELESS: SEVERAL DAYS
1. LITTLE INTEREST OR PLEASURE IN DOING THINGS: MORE THAN HALF THE DAYS
9. THOUGHTS THAT YOU WOULD BE BETTER OFF DEAD, OR OF HURTING YOURSELF: SEVERAL DAYS
SUM OF ALL RESPONSES TO PHQ QUESTIONS 1-9: 19
5. POOR APPETITE OR OVEREATING: NEARLY EVERY DAY

## 2025-05-12 NOTE — PROGRESS NOTES
I saw and evaluated the patient, performing the key elements of the service.  I discussed the findings, assessment and plan with the resident and agree with the resident's findings and plan as documented in the resident's note.

## 2025-05-12 NOTE — PROGRESS NOTES
[facility] Res[facility]   [address] [place] [address]   Office [phone], Fax [phone]      Subjective   Yara Garcia is a 49 y.o. female who presents for Follow-up (December white tongue-oral fungus, given oral rinse and excessive thirst.  Still has symptoms.  /Bumps back of sides of tongue.)      HPI  - Was treated for oropharyngeal candidiasis in December with nystatin swish and swallow  - did not like the nystatin rinse  - says her tongue still has a few white patches    Review of Systems   Review of Systems   Constitutional:  Negative for chills and fever.   Endocrine: Negative for polydipsia and polyuria.     Medical History  Past Medical History:   Diagnosis Date    ADHD     Anxiety disorder 5/26/2009    Depression 5/26/2009    Endometriosis 5/26/2009    Fibromyalgia 5/26/2009    Fibromyalgia     Fibromyalgia 1990    Fullness of submandibular gland 6/12/2023    Insomnia 5/26/2009       Medications  Current Outpatient Medications   Medication Sig    fluconazole (DIFLUCAN) 100 MG tablet Take 2 tablets by mouth daily for 14 days    traZODone (DESYREL) 50 MG tablet TAKE 1 TO 2 TABLETS BY MOUTH AT BEDTIME AS NEEDED FOR SLEEP    buPROPion (WELLBUTRIN SR) 200 MG extended release tablet Take 150 mg by mouth daily    valACYclovir (VALTREX) 500 MG tablet Take 1 tablet by mouth daily    ketoconazole (NIZORAL) 2 % cream 1 APPLICATION TO AFFECTED AREA ON ARMS EXTERNALLY TWICE A DAY DURING FLARES 14 DAYS    ALPRAZolam (XANAX) 0.5 MG tablet TAKE 1-2 TABLETS BY MOUTH AS NEEDED    amphetamine-dextroamphetamine (ADDERALL) 20 MG tablet TAKE 1 TABLET BY MOUTH TWICE A DAY (7/28)    omeprazole (PRILOSEC) 40 MG delayed release capsule TAKE 1 CAPSULE BY MOUTH EVERY DAY IN THE MORNING    perphenazine 4 MG tablet Take 1 tablet by mouth nightly    fluticasone (FLONASE) 50 MCG/ACT nasal spray 1 spray by Each Nostril route daily as needed for Rhinitis (Patient not taking: Reported on 5/12/2025)

## 2025-05-12 NOTE — PROGRESS NOTES
Yara Garcia is a 49 y.o. female      Chief Complaint   Patient presents with    Follow-up     December white tongue-oral fungus, given oral rinse and excessive thirst.  Still has symptoms.    Bumps back of sides of tongue.       \"Have you been to the ER, urgent care clinic since your last visit?  Hospitalized since your last visit?\"    NO    “Have you seen or consulted any other health care providers outside of [facility] since your last visit?”    NO            Click Here for Release of Records Request    Vitals:    05/12/25 1327   BP: 112/74   BP Site: Right Upper Arm   Patient Position: Sitting   BP Cuff Size: Small Adult   Pulse: 96   Temp: 98.5 °F (36.9 °C)   TempSrc: Oral   SpO2: 97%   Weight: 52.2 kg (115 lb)   Height: 1.575 m (5' 2.01\")           Medication Reconciliation Completed, changes notes. Please Update medication list.

## 2025-05-13 LAB
EST. AVERAGE GLUCOSE BLD GHB EST-MCNC: 105 MG/DL
HBA1C MFR BLD: 5.3 % (ref 4–5.6)
HIV 1+2 AB+HIV1 P24 AG SERPL QL IA: NONREACTIVE
HIV 1/2 RESULT COMMENT: NORMAL
RPR SER QL: NONREACTIVE

## 2025-05-14 SDOH — HEALTH STABILITY: PHYSICAL HEALTH: ON AVERAGE, HOW MANY DAYS PER WEEK DO YOU ENGAGE IN MODERATE TO STRENUOUS EXERCISE (LIKE A BRISK WALK)?: 0 DAYS

## 2025-05-14 ASSESSMENT — PATIENT HEALTH QUESTIONNAIRE - PHQ9
SUM OF ALL RESPONSES TO PHQ QUESTIONS 1-9: 20
8. MOVING OR SPEAKING SO SLOWLY THAT OTHER PEOPLE COULD HAVE NOTICED. OR THE OPPOSITE, BEING SO FIGETY OR RESTLESS THAT YOU HAVE BEEN MOVING AROUND A LOT MORE THAN USUAL: SEVERAL DAYS
9. THOUGHTS THAT YOU WOULD BE BETTER OFF DEAD, OR OF HURTING YOURSELF: SEVERAL DAYS
7. TROUBLE CONCENTRATING ON THINGS, SUCH AS READING THE NEWSPAPER OR WATCHING TELEVISION: NEARLY EVERY DAY
4. FEELING TIRED OR HAVING LITTLE ENERGY: NEARLY EVERY DAY
6. FEELING BAD ABOUT YOURSELF - OR THAT YOU ARE A FAILURE OR HAVE LET YOURSELF OR YOUR FAMILY DOWN: MORE THAN HALF THE DAYS
SUM OF ALL RESPONSES TO PHQ QUESTIONS 1-9: 21
3. TROUBLE FALLING OR STAYING ASLEEP: NEARLY EVERY DAY
SUM OF ALL RESPONSES TO PHQ QUESTIONS 1-9: 21
2. FEELING DOWN, DEPRESSED OR HOPELESS: MORE THAN HALF THE DAYS
5. POOR APPETITE OR OVEREATING: NEARLY EVERY DAY
1. LITTLE INTEREST OR PLEASURE IN DOING THINGS: NEARLY EVERY DAY
10. IF YOU CHECKED OFF ANY PROBLEMS, HOW DIFFICULT HAVE THESE PROBLEMS MADE IT FOR YOU TO DO YOUR WORK, TAKE CARE OF THINGS AT HOME, OR GET ALONG WITH OTHER PEOPLE: SOMEWHAT DIFFICULT
SUM OF ALL RESPONSES TO PHQ QUESTIONS 1-9: 21

## 2025-05-14 ASSESSMENT — LIFESTYLE VARIABLES
HOW MANY STANDARD DRINKS CONTAINING ALCOHOL DO YOU HAVE ON A TYPICAL DAY: PATIENT DOES NOT DRINK
HOW OFTEN DO YOU HAVE A DRINK CONTAINING ALCOHOL: MONTHLY OR LESS
HOW MANY STANDARD DRINKS CONTAINING ALCOHOL DO YOU HAVE ON A TYPICAL DAY: 0
HOW OFTEN DO YOU HAVE SIX OR MORE DRINKS ON ONE OCCASION: 1
HOW OFTEN DO YOU HAVE A DRINK CONTAINING ALCOHOL: 2

## 2025-05-15 LAB
C TRACH RRNA SPEC QL NAA+PROBE: NEGATIVE
N GONORRHOEA RRNA SPEC QL NAA+PROBE: NEGATIVE
SPECIMEN SOURCE: NORMAL

## 2025-05-16 ENCOUNTER — OFFICE VISIT (OUTPATIENT)
Age: 50
End: 2025-05-16
Payer: MEDICARE

## 2025-05-16 VITALS
SYSTOLIC BLOOD PRESSURE: 105 MMHG | TEMPERATURE: 98.6 F | OXYGEN SATURATION: 97 % | HEART RATE: 94 BPM | BODY MASS INDEX: 21.16 KG/M2 | HEIGHT: 62 IN | WEIGHT: 115 LBS | DIASTOLIC BLOOD PRESSURE: 67 MMHG

## 2025-05-16 DIAGNOSIS — Z13.220 SCREENING FOR HYPERLIPIDEMIA: ICD-10-CM

## 2025-05-16 DIAGNOSIS — Z13.1 SCREENING FOR DIABETES MELLITUS: ICD-10-CM

## 2025-05-16 DIAGNOSIS — R42 DIZZINESS: ICD-10-CM

## 2025-05-16 DIAGNOSIS — Z13.31 POSITIVE DEPRESSION SCREENING: ICD-10-CM

## 2025-05-16 DIAGNOSIS — L67.8 BRITTLE HAIR: ICD-10-CM

## 2025-05-16 DIAGNOSIS — H61.23 IMPACTED CERUMEN OF BOTH EARS: ICD-10-CM

## 2025-05-16 DIAGNOSIS — F43.10 PTSD (POST-TRAUMATIC STRESS DISORDER): ICD-10-CM

## 2025-05-16 DIAGNOSIS — Z00.00 MEDICARE ANNUAL WELLNESS VISIT, SUBSEQUENT: Primary | ICD-10-CM

## 2025-05-16 DIAGNOSIS — L60.3 BRITTLE NAILS: ICD-10-CM

## 2025-05-16 DIAGNOSIS — R25.1 TREMOR: ICD-10-CM

## 2025-05-16 DIAGNOSIS — Z87.820 HISTORY OF MULTIPLE CONCUSSIONS: ICD-10-CM

## 2025-05-16 DIAGNOSIS — Z82.0 FAMILY HISTORY OF PARKINSON'S DISEASE: ICD-10-CM

## 2025-05-16 DIAGNOSIS — R23.3 EASY BRUISING: ICD-10-CM

## 2025-05-16 PROCEDURE — 99214 OFFICE O/P EST MOD 30 MIN: CPT | Performed by: STUDENT IN AN ORGANIZED HEALTH CARE EDUCATION/TRAINING PROGRAM

## 2025-05-16 PROCEDURE — G0439 PPPS, SUBSEQ VISIT: HCPCS | Performed by: STUDENT IN AN ORGANIZED HEALTH CARE EDUCATION/TRAINING PROGRAM

## 2025-05-16 RX ORDER — CLONIDINE HYDROCHLORIDE 0.1 MG/1
0.1 TABLET ORAL NIGHTLY
COMMUNITY
Start: 2025-05-14

## 2025-05-16 ASSESSMENT — PATIENT HEALTH QUESTIONNAIRE - PHQ9
1. LITTLE INTEREST OR PLEASURE IN DOING THINGS: MORE THAN HALF THE DAYS
8. MOVING OR SPEAKING SO SLOWLY THAT OTHER PEOPLE COULD HAVE NOTICED. OR THE OPPOSITE, BEING SO FIGETY OR RESTLESS THAT YOU HAVE BEEN MOVING AROUND A LOT MORE THAN USUAL: NOT AT ALL
SUM OF ALL RESPONSES TO PHQ QUESTIONS 1-9: 16
SUM OF ALL RESPONSES TO PHQ QUESTIONS 1-9: 17
3. TROUBLE FALLING OR STAYING ASLEEP: NEARLY EVERY DAY
SUM OF ALL RESPONSES TO PHQ QUESTIONS 1-9: 17
4. FEELING TIRED OR HAVING LITTLE ENERGY: NEARLY EVERY DAY
2. FEELING DOWN, DEPRESSED OR HOPELESS: MORE THAN HALF THE DAYS
7. TROUBLE CONCENTRATING ON THINGS, SUCH AS READING THE NEWSPAPER OR WATCHING TELEVISION: MORE THAN HALF THE DAYS
6. FEELING BAD ABOUT YOURSELF - OR THAT YOU ARE A FAILURE OR HAVE LET YOURSELF OR YOUR FAMILY DOWN: SEVERAL DAYS
SUM OF ALL RESPONSES TO PHQ QUESTIONS 1-9: 17
9. THOUGHTS THAT YOU WOULD BE BETTER OFF DEAD, OR OF HURTING YOURSELF: SEVERAL DAYS
5. POOR APPETITE OR OVEREATING: NEARLY EVERY DAY

## 2025-05-16 NOTE — PROGRESS NOTES
Yara Garcia is a 49 y.o. female      Chief Complaint   Patient presents with    Medicare AWV       \"Have you been to the ER, urgent care clinic since your last visit?  Hospitalized since your last visit?\"    NO    “Have you seen or consulted any other health care providers outside of [facility] since your last visit?”    NO            Click Here for Release of Records Request    Vitals:    05/16/25 1100   BP: 105/67   BP Site: Right Upper Arm   Patient Position: Sitting   BP Cuff Size: Small Adult   Pulse: 94   Temp: 98.6 °F (37 °C)   TempSrc: Oral   SpO2: 97%   Weight: 52.2 kg (115 lb)   Height: 1.575 m (5' 2.01\")           Medication Reconciliation Completed, changes notes. Please Update medication list.

## 2025-05-16 NOTE — ASSESSMENT & PLAN NOTE
Orders:    ZACARIAS - Savanah Fritz MD, Neurology, [place]    ERLIN - Alexis Jimenez PsyD, Neuropsychology, [place]

## 2025-05-16 NOTE — ASSESSMENT & PLAN NOTE
Orders:    CBC; Future    Comprehensive Metabolic Panel; Future    TSH; Future    Vitamin B12; Future    Vitamin D 25 Hydroxy; Future    BS - Alexis Jimenez PsyD, Neuropsychology, [place]

## 2025-05-16 NOTE — PROGRESS NOTES
Medicare Annual Wellness Visit    Yara Garcia is here for Medicare AWV    Assessment & Plan   Assessment & Plan  Dizziness     Orders:    CBC; Future    Comprehensive Metabolic Panel; Future    TSH; Future    Vitamin B12; Future    Vitamin D 25 Hydroxy; Future    [facility] Alexis Jimenez PsyD, Neuropsychology, [place]    History of multiple concussions     Orders:    [facility] Savanah Anne MD, Neurology, [place]    Tremor     Orders:    CBC; Future    Comprehensive Metabolic Panel; Future    TSH; Future    Vitamin B12; Future    Vitamin D 25 Hydroxy; Future    [facility] Alexis Jimenez PsyD, Neuropsychology, [place]    PTSD (post-traumatic stress disorder)     Orders:    [facility] Alexis Jimenez PsyD, Neuropsychology, [place]    Family history of Parkinson's disease     Orders:    [facility] Savanah Anne MD, Neurology, [facility] Alexis Ledezma PsyD, Neuropsychology, [place]    Brittle nails     Orders:    CBC; Future    Comprehensive Metabolic Panel; Future    TSH; Future    Vitamin B12; Future    Vitamin D 25 Hydroxy; Future    Brittle hair     Orders:    CBC; Future    Comprehensive Metabolic Panel; Future    TSH; Future    Vitamin B12; Future    Vitamin D 25 Hydroxy; Future    Screening for diabetes mellitus     Orders:    Hemoglobin A1C; Future    Screening for hyperlipidemia     Orders:    Lipid Panel; Future    Medicare annual wellness visit, subsequent          Positive depression screening          Body mass index (BMI) 30.0-30.9, adult       Orders:    Vitamin D 25 Hydroxy; Future    Impacted cerumen of both ears       Orders:    carbamide peroxide (DEBROX) 6.5 % otic solution; Place 10 drops into both ears 2 times daily for 5 days    Easy bruising       Orders:    CBC; Future         No follow-ups on file.     Subjective       Patient's complete Health Risk Assessment and screening values have been reviewed and are found in Flowsheets. The following problems were reviewed today

## 2025-05-17 LAB
25(OH)D3 SERPL-MCNC: 72.6 NG/ML (ref 30–100)
ALBUMIN SERPL-MCNC: 4.4 G/DL (ref 3.5–5)
ALBUMIN/GLOB SERPL: 1.4 (ref 1.1–2.2)
ALP SERPL-CCNC: 71 U/L (ref 45–117)
ALT SERPL-CCNC: 18 U/L (ref 12–78)
ANION GAP SERPL CALC-SCNC: 8 MMOL/L (ref 2–12)
AST SERPL-CCNC: 10 U/L (ref 15–37)
BILIRUB SERPL-MCNC: 0.4 MG/DL (ref 0.2–1)
BUN SERPL-MCNC: 15 MG/DL (ref 6–20)
BUN/CREAT SERPL: 19 (ref 12–20)
CALCIUM SERPL-MCNC: 10 MG/DL (ref 8.5–10.1)
CHLORIDE SERPL-SCNC: 105 MMOL/L (ref 97–108)
CHOLEST SERPL-MCNC: 182 MG/DL
CO2 SERPL-SCNC: 27 MMOL/L (ref 21–32)
CREAT SERPL-MCNC: 0.8 MG/DL (ref 0.55–1.02)
ERYTHROCYTE [DISTWIDTH] IN BLOOD BY AUTOMATED COUNT: 12 % (ref 11.5–14.5)
EST. AVERAGE GLUCOSE BLD GHB EST-MCNC: 105 MG/DL
GLOBULIN SER CALC-MCNC: 3.2 G/DL (ref 2–4)
GLUCOSE SERPL-MCNC: 95 MG/DL (ref 65–100)
HBA1C MFR BLD: 5.3 % (ref 4–5.6)
HCT VFR BLD AUTO: 39.2 % (ref 35–47)
HDLC SERPL-MCNC: 74 MG/DL
HDLC SERPL: 2.5 (ref 0–5)
HGB BLD-MCNC: 13.5 G/DL (ref 11.5–16)
LDLC SERPL CALC-MCNC: 86.2 MG/DL (ref 0–100)
MCH RBC QN AUTO: 32.4 PG (ref 26–34)
MCHC RBC AUTO-ENTMCNC: 34.4 G/DL (ref 30–36.5)
MCV RBC AUTO: 94 FL (ref 80–99)
NRBC # BLD: 0 K/UL (ref 0–0.01)
NRBC BLD-RTO: 0 PER 100 WBC
PLATELET # BLD AUTO: 300 K/UL (ref 150–400)
PMV BLD AUTO: 11.8 FL (ref 8.9–12.9)
POTASSIUM SERPL-SCNC: 4.5 MMOL/L (ref 3.5–5.1)
PROT SERPL-MCNC: 7.6 G/DL (ref 6.4–8.2)
RBC # BLD AUTO: 4.17 M/UL (ref 3.8–5.2)
SODIUM SERPL-SCNC: 140 MMOL/L (ref 136–145)
TRIGL SERPL-MCNC: 109 MG/DL
TSH SERPL DL<=0.05 MIU/L-ACNC: 1.21 UIU/ML (ref 0.36–3.74)
VIT B12 SERPL-MCNC: 990 PG/ML (ref 193–986)
VLDLC SERPL CALC-MCNC: 21.8 MG/DL
WBC # BLD AUTO: 5.7 K/UL (ref 3.6–11)

## 2025-05-19 ENCOUNTER — TELEPHONE (OUTPATIENT)
Age: 50
End: 2025-05-19

## 2025-05-19 NOTE — TELEPHONE ENCOUNTER
Pt requesting advice on what to do about the wait times to see the recommended neurologist and neuropsychologist. Neurology couldn't get her in until December. Neuropsych not until April 2026. Call [phone].

## 2025-05-20 ENCOUNTER — RESULTS FOLLOW-UP (OUTPATIENT)
Age: 50
End: 2025-05-20

## 2025-05-28 ENCOUNTER — TELEPHONE (OUTPATIENT)
Age: 50
End: 2025-05-28

## 2025-05-28 NOTE — TELEPHONE ENCOUNTER
At Dr. uHnt's request, I called pt to let her know that he sent messages to both providers describing her case and complexity, and asked if they feel it is warranted to see her sooner. I also mentioned that Dr. Hunt wrote that if she is able to find other providers, outside of [facility] who can see her sooner, he would be happy to write them a referral. Pt thanked me, said she would check with the [facility] practices again and look into other providers outside of [facility].

## 2025-06-04 ENCOUNTER — OFFICE VISIT (OUTPATIENT)
Age: 50
End: 2025-06-04

## 2025-06-04 VITALS
BODY MASS INDEX: 21.16 KG/M2 | DIASTOLIC BLOOD PRESSURE: 71 MMHG | WEIGHT: 115 LBS | HEART RATE: 92 BPM | OXYGEN SATURATION: 98 % | RESPIRATION RATE: 16 BRPM | SYSTOLIC BLOOD PRESSURE: 146 MMHG | HEIGHT: 62 IN

## 2025-06-04 DIAGNOSIS — Z71.1 CONCERN ABOUT MEMORY: Primary | ICD-10-CM

## 2025-06-04 DIAGNOSIS — R25.1 TREMOR: ICD-10-CM

## 2025-06-04 DIAGNOSIS — R41.3 MEMORY DIFFICULTIES: ICD-10-CM

## 2025-06-04 NOTE — PROCEDURES
Cognitive Testing Evaluation Summary    Cut and paste the following into the patient's encounter notes in your EHR.    Patient Information:    JAMISON DARDEN  1975  Female  This 49 year old female was administered a battery of neurocognitive testing on 06/04/2025.    Reason for Testing:  Concern about memory    Tests Administered:  Trails A, Trails B, Stroop, Digit Symbol Substitution, Immediate Recognition, Delayed Recognition    Test Time:  The time spent administering cognitive testing was 16 minutes including setting the patient up,  creating the order, discussing cognitive testing, answering questions, administering the test / active  testing time, ensuring best practices (i.e. no distractions), and uploading the results. This cognitive  testing was administered by a technician.    Test Results:  Cognitive testing was provided via a battery of cognitive assessments. The pattern of test scores indicate that results are valid.    A Clinical Report with further description of scores and results is also available.    Overall: Patient tested in the 44th percentile (scaled standard score of 98).  Trails A: Patient tested in the 55th percentile (scaled standard score of 102).  Trails B: Patient tested in the 60th percentile (scaled standard score of 104).  Stroop: Patient tested in the 18th percentile (scaled standard score of 86).  Digit Symbol Substitution: Patient tested in the 75th percentile (scaled standard score of 110).  Immediate Recognition: Patient tested in the 54th percentile (scaled standard score of 101).  Delayed Recognition: Patient tested in the 15th percentile (scaled standard score of 84).    Interpretation of Test Scores:  Examination of individual component tests shows:  Attention - Trails A: Unlikely Impairment  Mental Flexibility - Trails B: Unlikely Impairment  Executive Function - Stroop: Unlikely Impairment  Processing Speed - Digit Symbol Substitution: Unlikely Impairment  Memory -

## 2025-06-04 NOTE — PATIENT INSTRUCTIONS
We do not typically call with results unless otherwise stated or there is something abnormal that needs addressed prior to your follow up with Dr. Fritz as he likes to discuss ALL results in person. Your test results will be added to the [facility] portal as they are finalized so you will be able to access those within the portal.    PRIOR AUTHORIZATION FOR MEDICATIONS    If you were prescribed medication during your visit, it may require a prior authorization which is a determination of the medication coverage made by your insurance plan.     This process can take 14 business days for most medications prescribed by our Neurologists.      If you haven't heard from our office or your pharmacy within the time outlined above, please contact our office.
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
## 2025-06-04 NOTE — PROGRESS NOTES
[address] (16 minute minimum)  16 minutes were spent administering cognitive testing by medical assistant/nurse.

## 2025-06-09 ENCOUNTER — TELEPHONE (OUTPATIENT)
Age: 50
End: 2025-06-09

## 2025-06-09 ENCOUNTER — PROCEDURE VISIT (OUTPATIENT)
Age: 50
End: 2025-06-09
Payer: MEDICARE

## 2025-06-09 DIAGNOSIS — R41.0 CONFUSION: Primary | ICD-10-CM

## 2025-06-09 PROCEDURE — 95819 EEG AWAKE AND ASLEEP: CPT | Performed by: PSYCHIATRY & NEUROLOGY

## 2025-06-10 ENCOUNTER — PATIENT MESSAGE (OUTPATIENT)
Age: 50
End: 2025-06-10

## 2025-06-10 DIAGNOSIS — B37.0 ORAL CANDIDA: Primary | ICD-10-CM

## 2025-06-12 RX ORDER — FLUCONAZOLE 100 MG/1
100 TABLET ORAL DAILY
Qty: 14 TABLET | Refills: 0 | Status: SHIPPED | OUTPATIENT
Start: 2025-06-12 | End: 2025-06-26

## 2025-06-16 NOTE — PROCEDURES
Procedures      [facility]   Electroencephalogram Report    Procedure ID: [id number] Procedure Date: 6/9/2025   Patient Name: Yara Garcia YOB: 1975   Procedure Type: Routine Medical Record No: [id number]       An EEG is requested in this 49-year-old lady to evaluate for epileptiform abnormalities.  Medications said to include Xanax, Adderall, perphenazine, Wellbutrin, Catapres    This tracing is obtained during the awake, drowsy, and sleeping states.    During wakefulness, there are intermittent runs of posteriorly dominant and symmetric low to medium amplitude 10 cps activities which attenuate with eye opening.  Lower voltage faster frequency activities are seen symmetrically over the anterior head regions.    Hyperventilation little altered the tracing.    Intermittent photic stimulation induces symmetric posterior driving responses.    Stage II sleep is attained.    Interpretation  This EEG recorded during the awake, drowsy, and sleeping states is normal        Savanah Fritz MD

## 2025-06-17 ENCOUNTER — OFFICE VISIT (OUTPATIENT)
Age: 50
End: 2025-06-17
Payer: MEDICARE

## 2025-06-17 VITALS
SYSTOLIC BLOOD PRESSURE: 94 MMHG | HEIGHT: 62 IN | OXYGEN SATURATION: 98 % | DIASTOLIC BLOOD PRESSURE: 66 MMHG | HEART RATE: 94 BPM | BODY MASS INDEX: 20.98 KG/M2 | WEIGHT: 114 LBS | TEMPERATURE: 98.2 F

## 2025-06-17 DIAGNOSIS — M53.3 CHRONIC SI JOINT PAIN: ICD-10-CM

## 2025-06-17 DIAGNOSIS — G89.29 CHRONIC SI JOINT PAIN: ICD-10-CM

## 2025-06-17 DIAGNOSIS — L85.3 DRY SKIN: Primary | ICD-10-CM

## 2025-06-17 DIAGNOSIS — Z12.31 BREAST CANCER SCREENING BY MAMMOGRAM: ICD-10-CM

## 2025-06-17 DIAGNOSIS — B37.0 ORAL THRUSH: ICD-10-CM

## 2025-06-17 DIAGNOSIS — M62.838 MUSCLE SPASMS OF NECK: ICD-10-CM

## 2025-06-17 PROCEDURE — 99214 OFFICE O/P EST MOD 30 MIN: CPT | Performed by: STUDENT IN AN ORGANIZED HEALTH CARE EDUCATION/TRAINING PROGRAM

## 2025-06-17 RX ORDER — CYCLOBENZAPRINE HCL 5 MG
5 TABLET ORAL NIGHTLY PRN
Qty: 30 TABLET | Refills: 0 | Status: SHIPPED | OUTPATIENT
Start: 2025-06-17 | End: 2025-07-17

## 2025-06-17 NOTE — PROGRESS NOTES
Yara Garcia is a 49 y.o. female      Chief Complaint   Patient presents with    Follow-up     2-3 weeks has had dry scaling skin patch, non-itchy inside of her thumb of left hand.  Wants tongue checked for lingering thrush.  Right side SI joint pain, has had injections in the past     Electronic medical records request sent to [facility] for mammogram and pap smear results.  \"Have you been to the ER, urgent care clinic since your last visit?  Hospitalized since your last visit?\"    NO    “Have you seen or consulted any other health care providers outside of [facility] since your last visit?”    NO    Have you had a mammogram?”   YES - Where: 1/2025  [facility] Nurse/CMA to request most recent records if not in the chart    Date of last Mammogram: 1/4/2024             Click Here for Release of Records Request    Vitals:    06/17/25 1054   BP: 94/66   BP Site: Right Upper Arm   Patient Position: Sitting   BP Cuff Size: Medium Adult   Pulse: 94   Temp: 98.2 °F (36.8 °C)   TempSrc: Oral   SpO2: 98%   Weight: 51.7 kg (114 lb)   Height: 1.575 m (5' 2.01\")           Medication Reconciliation Completed, changes notes. Please Update medication list.
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
## 2025-06-18 ENCOUNTER — PATIENT MESSAGE (OUTPATIENT)
Age: 50
End: 2025-06-18

## 2025-06-25 ENCOUNTER — PATIENT MESSAGE (OUTPATIENT)
Age: 50
End: 2025-06-25

## 2025-06-30 NOTE — TELEPHONE ENCOUNTER
MRI has been cancelled as pt is not able to afford the copay for MRI at either [facility] or [facility] facilities at this time.

## 2025-07-01 ENCOUNTER — OFFICE VISIT (OUTPATIENT)
Age: 50
End: 2025-07-01
Payer: MEDICARE

## 2025-07-01 VITALS
SYSTOLIC BLOOD PRESSURE: 104 MMHG | DIASTOLIC BLOOD PRESSURE: 67 MMHG | HEART RATE: 91 BPM | OXYGEN SATURATION: 98 % | WEIGHT: 115.4 LBS | RESPIRATION RATE: 18 BRPM | BODY MASS INDEX: 21.23 KG/M2 | HEIGHT: 62 IN | TEMPERATURE: 98 F

## 2025-07-01 DIAGNOSIS — G89.29 CHRONIC BILATERAL LOW BACK PAIN WITHOUT SCIATICA: Primary | ICD-10-CM

## 2025-07-01 DIAGNOSIS — M54.50 CHRONIC BILATERAL LOW BACK PAIN WITHOUT SCIATICA: Primary | ICD-10-CM

## 2025-07-01 PROCEDURE — 99213 OFFICE O/P EST LOW 20 MIN: CPT | Performed by: FAMILY MEDICINE

## 2025-07-01 NOTE — PROGRESS NOTES
[facility]   [facility]      Chief Complaint:   Chief Complaint   Patient presents with    Hip Pain     RT Hip       Subjective:   History: This patient is a 49 y.o. female with a chief complaint of back pain. Chronically intermittent.  SI pain and lower back spasm in the past.  Trigger point injection and SI injection have helped in the past. Currently asymptomatic.   No bowel or bladder incontinence. No fever, night sweats, or weight changes. No saddle anesthesia.    Review of Systems:  General/Constitutional:  No fever, chills, sweats, fatigue, night sweats, weakness, weight loss or weight gain   Head: No headache, no trauma   Neck: No swelling, masses, stiffness, pain, or limited movement   Cardiac: No chest pain   Respiratory: No cough, shortness of breath, or dyspnea on exertion   GI: No incontinence. No nausea/vomiting, diarrhea, abdominal pain, bloody or dark stools  : No incontinence. No change in urinary habits.  Peripheral Vascular: No edema, coldness, numbness, discoloration, pain, or paresthesias   Musculoskeletal: As per HPI  Neurological: No saddle distribution paresthesia. No siatic pain. No loss of consciousness, dizziness, seizures, dysarthria, cognitive changes, problems with balance, or unilateral weakness.    Past Medical History:   Diagnosis Date    ADHD     Anxiety disorder 05/26/2009    Bipolar disorder ([facility])     Cancer ([facility]) 05/2024    Melanoma    Depression 05/26/2009    Endometriosis 05/26/2009    Fibromyalgia 05/26/2009    Fibromyalgia     Fibromyalgia 1990    Fullness of submandibular gland 06/12/2023    Insomnia 05/26/2009    Memory disorder     Migraine 1988    Very rarely    Sleep difficulties 1990’s    Tremor 2000’s     Family History   Problem Relation Age of Onset    Depression Mother     Osteoporosis Mother     Coronary Art Dis Father         s/p CABG    Heart Disease Father     Alcohol Abuse Father

## 2025-07-01 NOTE — PROGRESS NOTES
Identified pt with two pt identifiers(name and ). Reviewed record in preparation for visit and have obtained necessary documentation.  Chief Complaint   Patient presents with    Hip Pain     RT Hip      Pt here today for chronic hip pain that has just hurting again a few months ago. PT has had an injection in the past that she states lasted a few yrs. Pt does apply heating pad and hot baths.       Vitals:    25 1031   BP: 104/67   BP Site: Right Upper Arm   Patient Position: Sitting   BP Cuff Size: Small Adult   Pulse: 91   Resp: 18   Temp: 98 °F (36.7 °C)   TempSrc: Temporal   SpO2: 98%   Weight: 52.3 kg (115 lb 6.4 oz)   Height: 1.575 m (5' 2.01\")         Coordination of Care Questionnaire:  :     \"Have you been to the ER, urgent care clinic since your last visit?  Hospitalized since your last visit?\"    NO    “Have you seen or consulted any other health care providers outside of [facility] since your last visit?”    NO            Click Here for Release of Records Request

## 2025-07-15 ENCOUNTER — HOSPITAL ENCOUNTER (OUTPATIENT)
Facility: HOSPITAL | Age: 50
Discharge: HOME | End: 2025-07-18
Attending: PSYCHIATRY & NEUROLOGY

## 2025-07-21 ENCOUNTER — HOSPITAL ENCOUNTER (OUTPATIENT)
Facility: HOSPITAL | Age: 50
Discharge: HOME | End: 2025-07-24
Attending: PSYCHIATRY & NEUROLOGY
Payer: MEDICARE

## 2025-07-21 DIAGNOSIS — Z71.1 CONCERN ABOUT MEMORY: ICD-10-CM

## 2025-07-21 DIAGNOSIS — R41.3 MEMORY DIFFICULTIES: ICD-10-CM

## 2025-07-21 DIAGNOSIS — R25.1 TREMOR: ICD-10-CM

## 2025-07-21 PROCEDURE — 70551 MRI BRAIN STEM W/O DYE: CPT

## 2025-08-08 ENCOUNTER — OFFICE VISIT (OUTPATIENT)
Age: 50
End: 2025-08-08

## 2025-08-08 DIAGNOSIS — Z86.59 HISTORY OF BIPOLAR DISORDER: ICD-10-CM

## 2025-08-08 DIAGNOSIS — Z87.820 HISTORY OF MULTIPLE CONCUSSIONS: ICD-10-CM

## 2025-08-08 DIAGNOSIS — F41.1 GENERALIZED ANXIETY DISORDER: ICD-10-CM

## 2025-08-08 DIAGNOSIS — G31.84 MILD COGNITIVE IMPAIRMENT: Primary | ICD-10-CM

## 2025-08-08 DIAGNOSIS — Z86.59 HISTORY OF ADHD: ICD-10-CM

## 2025-08-08 DIAGNOSIS — F43.10 PTSD (POST-TRAUMATIC STRESS DISORDER): ICD-10-CM

## 2025-08-08 DIAGNOSIS — R41.89 COGNITIVE DECLINE: ICD-10-CM

## 2025-08-08 DIAGNOSIS — F42.8 OTHER OBSESSIVE-COMPULSIVE DISORDER: ICD-10-CM

## 2025-08-08 DIAGNOSIS — R46.89 AUTISTIC BEHAVIOR: ICD-10-CM

## 2025-08-18 ENCOUNTER — PROCEDURE VISIT (OUTPATIENT)
Age: 50
End: 2025-08-18

## 2025-08-18 DIAGNOSIS — Z86.59 HISTORY OF BIPOLAR DISORDER: ICD-10-CM

## 2025-08-18 DIAGNOSIS — G31.84 MILD COGNITIVE IMPAIRMENT: Primary | ICD-10-CM

## 2025-08-18 DIAGNOSIS — R41.3 FUNCTIONAL MEMORY PROBLEM: ICD-10-CM

## 2025-08-18 DIAGNOSIS — F41.9 ANXIETY WITH SOMATIZATION: ICD-10-CM

## 2025-08-18 DIAGNOSIS — Z87.820 HISTORY OF MULTIPLE CONCUSSIONS: ICD-10-CM

## 2025-08-18 DIAGNOSIS — F32.1 MODERATE MAJOR DEPRESSION (HCC): ICD-10-CM

## 2025-08-18 DIAGNOSIS — F45.0 ANXIETY WITH SOMATIZATION: ICD-10-CM

## 2025-08-18 DIAGNOSIS — F43.10 PTSD (POST-TRAUMATIC STRESS DISORDER): ICD-10-CM

## 2025-08-18 DIAGNOSIS — F42.8 OTHER OBSESSIVE-COMPULSIVE DISORDER: ICD-10-CM

## 2025-08-18 DIAGNOSIS — F84.0 AUTISM SPECTRUM DISORDER REQUIRING SUPPORT (LEVEL 1): ICD-10-CM

## 2025-09-04 ENCOUNTER — OFFICE VISIT (OUTPATIENT)
Age: 50
End: 2025-09-04
Payer: MEDICARE

## 2025-09-04 DIAGNOSIS — F42.8 OTHER OBSESSIVE-COMPULSIVE DISORDER: ICD-10-CM

## 2025-09-04 DIAGNOSIS — R41.3 FUNCTIONAL MEMORY PROBLEM: ICD-10-CM

## 2025-09-04 DIAGNOSIS — F84.0 AUTISM SPECTRUM DISORDER REQUIRING SUPPORT (LEVEL 1): ICD-10-CM

## 2025-09-04 DIAGNOSIS — F32.1 MODERATE MAJOR DEPRESSION (HCC): ICD-10-CM

## 2025-09-04 DIAGNOSIS — G31.84 MILD COGNITIVE IMPAIRMENT: Primary | ICD-10-CM

## 2025-09-04 DIAGNOSIS — F41.9 ANXIETY WITH SOMATIZATION: ICD-10-CM

## 2025-09-04 DIAGNOSIS — F43.10 PTSD (POST-TRAUMATIC STRESS DISORDER): ICD-10-CM

## 2025-09-04 DIAGNOSIS — F45.0 ANXIETY WITH SOMATIZATION: ICD-10-CM

## 2025-09-04 DIAGNOSIS — Z87.820 HISTORY OF MULTIPLE CONCUSSIONS: ICD-10-CM

## 2025-09-04 DIAGNOSIS — Z86.59 HISTORY OF BIPOLAR DISORDER: ICD-10-CM

## 2025-09-04 PROCEDURE — 96132 NRPSYC TST EVAL PHYS/QHP 1ST: CPT | Performed by: CLINICAL NEUROPSYCHOLOGIST
